# Patient Record
Sex: FEMALE | Race: WHITE | Employment: OTHER | URBAN - METROPOLITAN AREA
[De-identification: names, ages, dates, MRNs, and addresses within clinical notes are randomized per-mention and may not be internally consistent; named-entity substitution may affect disease eponyms.]

---

## 2017-02-06 ENCOUNTER — APPOINTMENT (OUTPATIENT)
Dept: CT IMAGING | Age: 82
End: 2017-02-06
Attending: FAMILY MEDICINE
Payer: MEDICARE

## 2017-02-06 ENCOUNTER — HOSPITAL ENCOUNTER (EMERGENCY)
Age: 82
Discharge: HOME | End: 2017-02-06
Attending: FAMILY MEDICINE
Payer: MEDICARE

## 2017-02-06 ENCOUNTER — APPOINTMENT (OUTPATIENT)
Dept: GENERAL RADIOLOGY | Age: 82
End: 2017-02-06
Attending: FAMILY MEDICINE
Payer: MEDICARE

## 2017-02-06 VITALS
TEMPERATURE: 97.5 F | DIASTOLIC BLOOD PRESSURE: 63 MMHG | SYSTOLIC BLOOD PRESSURE: 166 MMHG | HEART RATE: 82 BPM | OXYGEN SATURATION: 95 % | RESPIRATION RATE: 16 BRPM | WEIGHT: 126 LBS | HEIGHT: 64 IN | BODY MASS INDEX: 21.51 KG/M2

## 2017-02-06 DIAGNOSIS — N39.0 ACUTE UTI: ICD-10-CM

## 2017-02-06 DIAGNOSIS — M79.641 RIGHT HAND PAIN: Primary | ICD-10-CM
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
## 2017-02-06 LAB
ANION GAP BLD CALC-SCNC: 6 MMOL/L (ref 3–18)
APPEARANCE UR: CLEAR
BACTERIA URNS QL MICRO: ABNORMAL /HPF
BASOPHILS # BLD AUTO: 0 K/UL (ref 0–0.06)
BASOPHILS # BLD: 0 % (ref 0–2)
BILIRUB UR QL: NEGATIVE
BUN SERPL-MCNC: 35 MG/DL (ref 7–18)
BUN/CREAT SERPL: 20 (ref 12–20)
CALCIUM SERPL-MCNC: 9.1 MG/DL (ref 8.5–10.1)
CHLORIDE SERPL-SCNC: 108 MMOL/L (ref 100–108)
CK MB CFR SERPL CALC: 2 % (ref 0–4)
CK MB SERPL-MCNC: 0.9 NG/ML (ref 0.5–3.6)
CK SERPL-CCNC: 45 U/L (ref 26–192)
CO2 SERPL-SCNC: 30 MMOL/L (ref 21–32)
COLOR UR: YELLOW
CREAT SERPL-MCNC: 1.71 MG/DL (ref 0.6–1.3)
DIFFERENTIAL METHOD BLD: ABNORMAL
EOSINOPHIL # BLD: 0.2 K/UL (ref 0–0.4)
EOSINOPHIL NFR BLD: 3 % (ref 0–5)
EPITH CASTS URNS QL MICRO: ABNORMAL /LPF (ref 0–5)
ERYTHROCYTE [DISTWIDTH] IN BLOOD BY AUTOMATED COUNT: 13.6 % (ref 11.6–14.5)
GLUCOSE SERPL-MCNC: 99 MG/DL (ref 74–99)
GLUCOSE UR STRIP.AUTO-MCNC: NEGATIVE MG/DL
HCT VFR BLD AUTO: 31 % (ref 35–45)
HGB BLD-MCNC: 9.8 G/DL (ref 12–16)
HGB UR QL STRIP: NEGATIVE
KETONES UR QL STRIP.AUTO: NEGATIVE MG/DL
LEUKOCYTE ESTERASE UR QL STRIP.AUTO: ABNORMAL
LYMPHOCYTES # BLD AUTO: 22 % (ref 21–52)
LYMPHOCYTES # BLD: 1.5 K/UL (ref 0.9–3.6)
MCH RBC QN AUTO: 30.8 PG (ref 24–34)
MCHC RBC AUTO-ENTMCNC: 31.6 G/DL (ref 31–37)
MCV RBC AUTO: 97.5 FL (ref 74–97)
MONOCYTES # BLD: 0.6 K/UL (ref 0.05–1.2)
MONOCYTES NFR BLD AUTO: 8 % (ref 3–10)
NEUTS SEG # BLD: 4.5 K/UL (ref 1.8–8)
NEUTS SEG NFR BLD AUTO: 67 % (ref 40–73)
NITRITE UR QL STRIP.AUTO: NEGATIVE
PH UR STRIP: 5 [PH] (ref 5–8)
PLATELET # BLD AUTO: 235 K/UL (ref 135–420)
PMV BLD AUTO: 9.3 FL (ref 9.2–11.8)
POTASSIUM SERPL-SCNC: 4.2 MMOL/L (ref 3.5–5.5)
PROT UR STRIP-MCNC: NEGATIVE MG/DL
RBC # BLD AUTO: 3.18 M/UL (ref 4.2–5.3)
RBC #/AREA URNS HPF: 0 /HPF (ref 0–5)
SODIUM SERPL-SCNC: 144 MMOL/L (ref 136–145)
SP GR UR REFRACTOMETRY: 1.01 (ref 1–1.03)
TROPONIN I SERPL-MCNC: 0.03 NG/ML (ref 0–0.06)
UROBILINOGEN UR QL STRIP.AUTO: 0.2 EU/DL (ref 0.2–1)
WBC # BLD AUTO: 6.7 K/UL (ref 4.6–13.2)
WBC URNS QL MICRO: ABNORMAL /HPF (ref 0–5)

## 2017-02-06 PROCEDURE — 93005 ELECTROCARDIOGRAM TRACING: CPT

## 2017-02-06 PROCEDURE — 74011250636 HC RX REV CODE- 250/636: Performed by: FAMILY MEDICINE

## 2017-02-06 PROCEDURE — 70450 CT HEAD/BRAIN W/O DYE: CPT

## 2017-02-06 PROCEDURE — 80048 BASIC METABOLIC PNL TOTAL CA: CPT | Performed by: FAMILY MEDICINE

## 2017-02-06 PROCEDURE — 82550 ASSAY OF CK (CPK): CPT | Performed by: FAMILY MEDICINE

## 2017-02-06 PROCEDURE — 73130 X-RAY EXAM OF HAND: CPT

## 2017-02-06 PROCEDURE — 85025 COMPLETE CBC W/AUTO DIFF WBC: CPT | Performed by: FAMILY MEDICINE

## 2017-02-06 PROCEDURE — 81001 URINALYSIS AUTO W/SCOPE: CPT | Performed by: FAMILY MEDICINE

## 2017-02-06 PROCEDURE — 96372 THER/PROPH/DIAG INJ SC/IM: CPT

## 2017-02-06 PROCEDURE — 71010 XR CHEST PORT: CPT

## 2017-02-06 PROCEDURE — 77030011943

## 2017-02-06 PROCEDURE — 51701 INSERT BLADDER CATHETER: CPT

## 2017-02-06 PROCEDURE — 73110 X-RAY EXAM OF WRIST: CPT

## 2017-02-06 PROCEDURE — 99285 EMERGENCY DEPT VISIT HI MDM: CPT

## 2017-02-06 RX ORDER — KETOROLAC TROMETHAMINE 30 MG/ML
15 INJECTION, SOLUTION INTRAMUSCULAR; INTRAVENOUS
Status: COMPLETED | OUTPATIENT
Start: 2017-02-06 | End: 2017-02-06

## 2017-02-06 RX ORDER — CIPROFLOXACIN 500 MG/1
500 TABLET ORAL 2 TIMES DAILY
Qty: 14 TAB | Refills: 0 | Status: SHIPPED | OUTPATIENT
Start: 2017-02-06 | End: 2017-02-13

## 2017-02-06 RX ADMIN — KETOROLAC TROMETHAMINE 15 MG: 30 INJECTION, SOLUTION INTRAMUSCULAR at 14:07

## 2017-02-06 NOTE — ED TRIAGE NOTES
Per EMS, pt fell from standing height onto carpet while attempting to get up. Pt w/ hx of dementia and arrives A&O per baseline A&O x 2-3. Pt holding right forearm but states \"I hurt all over. \" No deformity or bruising noted to extremity. Sepsis Screening completed    (  )Patient meets SIRS criteria. (X)Patient does not meet SIRS criteria.       SIRS Criteria is achieved when two or more of the following are present   Temperature < 96.8°F (36°C) or > 100.9°F (38.3°C)   Heart Rate > 90 beats per minute   Respiratory Rate > 20 beats per minute   WBC count > 12,000 or <4,000 or > 10% bands

## 2017-02-06 NOTE — ED PROVIDER NOTES
[address]  EMERGENCY DEPARTMENT HISTORY AND PHYSICAL EXAM       Date: 2/6/2017   Patient Name: Alisa Manjarrez   YOB: 1924  Medical Record Number: [id number]    History of Presenting Illness     Chief Complaint   Patient presents with    Fall        History Provided By:  EMS     Additional History:   11:48 AM   Alisa Manjarrez is a 80 y.o. female with a hx of CVA and dementia who presents to the emergency department from [facility] via EMS for right forearm pain s/p fall this morning, per EMS. They report that pt fell onto carpet and they are unsure if pt hit her head. ROS limited due to condition. Primary Care Provider: Stacey Giron MD   Specialist:    Past History     Past Medical History:   Past Medical History   Diagnosis Date    Anemia NEC     Dementia     Hearing reduced     Hypertension     Shingles     Stroke [facility])         Past Surgical History:   Past Surgical History   Procedure Laterality Date    Pr abdomen surgery proc unlisted      Hx cholecystectomy      Hx gyn      Hx hysterectomy          Family History:   History reviewed. No pertinent family history. Social History:   Social History   Substance Use Topics    Smoking status: Former Smoker    Smokeless tobacco: None    Alcohol use No        Allergies: Allergies   Allergen Reactions    Penicillins Hives        Review of Systems   Review of Systems   Unable to perform ROS: Dementia       Physical Exam  Vitals:    02/06/17 1245 02/06/17 1300 02/06/17 1330 02/06/17 1400   BP: 163/81 188/82 178/57 174/68   Pulse: 78 80 80 80   Resp: 22 27 19 17   Temp:       SpO2: 97% 95% 97% 95%   Weight:       Height:           Physical Exam   Nursing note and vitals reviewed. Vital signs and nursing notes reviewed    CONSTITUTIONAL: Alert, in no apparent distress; well-developed; well-nourished. Elderly male. Demented. HEAD:  Normocephalic, atraumatic  EYES: PERRL; EOM's intact.  Chronically dry right eye, right eye redness/conjunctiva injected. ENTM: Nose: no rhinorrhea; Throat: no erythema or exudate, mucous membranes moist  Neck:  No JVD, supple without lymphadenopathy  RESP: Chest clear, equal breath sounds. CV: S1 and S2 WNL; No murmurs, gallops or rubs. GI: Normal bowel sounds, abdomen soft and non-tender. No masses or organomegaly. : No costo-vertebral angle tenderness. BACK:  Non-tender  UPPER EXT:  Tenderness right hand/right wrist; no obvious deformity. LOWER EXT: No edema, no calf tenderness. Distal pulses intact. NEURO: CN intact, reflexes 2/4 and sym, strength 5/5 and sym, sensation intact. Can answer some questions. Moves all extremities. SKIN: No rashes; Normal for age and stage.       Diagnostic Study Results     Labs -      Recent Results (from the past 12 hour(s))   URINALYSIS W/ RFLX MICROSCOPIC    Collection Time: 02/06/17 12:15 PM   Result Value Ref Range    Color YELLOW      Appearance CLEAR      Specific gravity 1.011 1.005 - 1.030      pH (UA) 5.0 5.0 - 8.0      Protein NEGATIVE  NEG mg/dL    Glucose NEGATIVE  NEG mg/dL    Ketone NEGATIVE  NEG mg/dL    Bilirubin NEGATIVE  NEG      Blood NEGATIVE  NEG      Urobilinogen 0.2 0.2 - 1.0 EU/dL    Nitrites NEGATIVE  NEG      Leukocyte Esterase SMALL (A) NEG     URINE MICROSCOPIC ONLY    Collection Time: 02/06/17 12:15 PM   Result Value Ref Range    WBC 2 to 4 0 - 5 /hpf    RBC 0 0 - 5 /hpf    Epithelial cells FEW 0 - 5 /lpf    Bacteria 3+ (A) NEG /hpf   CBC WITH AUTOMATED DIFF    Collection Time: 02/06/17 12:25 PM   Result Value Ref Range    WBC 6.7 4.6 - 13.2 K/uL    RBC 3.18 (L) 4.20 - 5.30 M/uL    HGB 9.8 (L) 12.0 - 16.0 g/dL    HCT 31.0 (L) 35.0 - 45.0 %    MCV 97.5 (H) 74.0 - 97.0 FL    MCH 30.8 24.0 - 34.0 PG    MCHC 31.6 31.0 - 37.0 g/dL    RDW 13.6 11.6 - 14.5 %    PLATELET 973 144 - 833 K/uL    MPV 9.3 9.2 - 11.8 FL    NEUTROPHILS 67 40 - 73 %    LYMPHOCYTES 22 21 - 52 %    MONOCYTES 8 3 - 10 %    EOSINOPHILS 3 0 - 5 % BASOPHILS 0 0 - 2 %    ABS. NEUTROPHILS 4.5 1.8 - 8.0 K/UL    ABS. LYMPHOCYTES 1.5 0.9 - 3.6 K/UL    ABS. MONOCYTES 0.6 0.05 - 1.2 K/UL    ABS. EOSINOPHILS 0.2 0.0 - 0.4 K/UL    ABS. BASOPHILS 0.0 0.0 - 0.06 K/UL    DF AUTOMATED     METABOLIC PANEL, BASIC    Collection Time: 02/06/17 12:25 PM   Result Value Ref Range    Sodium 144 136 - 145 mmol/L    Potassium 4.2 3.5 - 5.5 mmol/L    Chloride 108 100 - 108 mmol/L    CO2 30 21 - 32 mmol/L    Anion gap 6 3.0 - 18 mmol/L    Glucose 99 74 - 99 mg/dL    BUN 35 (H) 7.0 - 18 MG/DL    Creatinine 1.71 (H) 0.6 - 1.3 MG/DL    BUN/Creatinine ratio 20 12 - 20      GFR est AA 34 (L) >60 ml/min/1.73m2    GFR est non-AA 28 (L) >60 ml/min/1.73m2    Calcium 9.1 8.5 - 10.1 MG/DL   CARDIAC PANEL,(CK, CKMB & TROPONIN)    Collection Time: 02/06/17 12:25 PM   Result Value Ref Range    CK 45 26 - 192 U/L    CK - MB 0.9 0.5 - 3.6 ng/ml    CK-MB Index 2.0 0.0 - 4.0 %    Troponin-I, Qt. 0.03 0.00 - 0.06 NG/ML   EKG, 12 LEAD, INITIAL    Collection Time: 02/06/17  1:12 PM   Result Value Ref Range    Ventricular Rate 81 BPM    Atrial Rate 81 BPM    P-R Interval 210 ms    QRS Duration 108 ms    Q-T Interval 410 ms    QTC Calculation (Bezet) 476 ms    Calculated P Axis 69 degrees    Calculated R Axis -30 degrees    Calculated T Axis 115 degrees    Diagnosis       Sinus rhythm with 1st degree AV block  Left axis deviation  Left ventricular hypertrophy with repolarization abnormality  Abnormal ECG  When compared with ECG of 22-APR-2015 12:24,  ME interval has increased         Radiologic Studies -  XR CHEST PORT   Final Result  IMPRESSION:     No acute pulmonary process identified. As read by the radiologist.       CT HEAD WO CONT   Final Result  IMPRESSION:        1. No acute intracranial abnormality. Of note, noncontrast head CT can be normal  in the context of early acute stroke.   2. Subcortical and periventricular white matter hypoattenuation, an unchanged  and nonspecific finding likely pertaining to chronic ischemic microvascular  Change. As read by the radiologist.       XR HAND RT MIN 3 V    (Results Pending)   XR WRIST RT AP/LAT/OBL MIN 3V    (Results Pending)      2:16 PM  RADIOLOGY FINDINGS  Right hand X-ray shows no acute process, chronic changes  Pending review by Radiologist     2:16 PM  RADIOLOGY FINDINGS  Right wrist X-ray shows no acute process, chronic changes  Pending review by Radiologist       Medical Decision Making   I am the first provider for this patient. I reviewed the vital signs, available nursing notes, past medical history, past surgical history, family history and social history. Vital Signs-Reviewed the patient's vital signs. Patient Vitals for the past 12 hrs:   Temp Pulse Resp BP SpO2   02/06/17 1400 - 80 17 174/68 95 %   02/06/17 1330 - 80 19 178/57 97 %   02/06/17 1300 - 80 27 188/82 95 %   02/06/17 1245 - 78 22 163/81 97 %   02/06/17 1145 97.5 °F (36.4 °C) 76 19 148/63 100 %       Pulse Oximetry Analysis - Normal 100% on RA     Cardiac Monitor:   Rate: 77 bpm  Rhythm: Normal Sinus Rhythm     EKG interpretation: (Preliminary)  Sinus rhythm with 1st degree AV block, 81 bpm, left axis deviation, left ventricular hypertrophy with repolarization abnormality   EKG read by Sylwia Morel MD at 13:12    Old Medical Records: Nursing notes. Provider Notes:      INITIAL CLINICAL IMPRESSION and PLANS:  The patient presents with the primary complaint(s) of: fall. The presentation, to include historical aspects and clinical findings are consistent with the DX of post head injury. However, other possible DX's to consider as primary, associated with, or exacerbated by include:    1. Wrist fracture  2. Wrist sprain  3. Hand fracture  4. Hand contusion    Considering the above, my initial management plan to evaluate and therapeutic interventions include the following and as noted in the orders:    1. Labs: CBC, BMP, cardiac panel, UA  2.   Imaging: CXR, CT head, XR right hand, XR right wrist    Medications Given in the ED:  Medications   ketorolac (TORADOL) injection 15 mg (15 mg IntraMUSCular Given 2/6/17 9874)       PROGRESS NOTE:   11:48 AM  Initial assessment performed. Discharge Note:  2:39 PM   Pt has been reexamined. Patient has no new complaints, changes, or physical findings. Care plan outlined and precautions discussed. Results were reviewed with the patient. All medications were reviewed with the patient; will d/c home with Cipro. All of pt's questions and concerns were addressed. Patient was instructed and agrees to follow up with PCP, as well as to return to the ED upon further deterioration. Patient is ready to go home. Diagnosis   Clinical Impression:   1. Right hand pain    2. Fall, initial encounter    3. Acute UTI         Discussion:  Patient presented with unwitnessed fall and complains of pain all over. There is  no obvious deformities in any extremities. Patient's x-rays of right hand/ wrist and chest were negative, and her head CT was negative. She will be sent back to the nursing home she does have a UTI. She will be treated with Cipro. Follow-up Information     Follow up With Details Comments Contact Info    Chucky Munoz MD Schedule an appointment as soon as possible for a visit  [address] [id number] [phone]      [facility] EMERGENCY DEPT  As needed, If symptoms worsen [address] [facility] [address] [id number]  [phone]          Current Discharge Medication List      START taking these medications    Details   ciprofloxacin HCl (CIPRO) 500 mg tablet Take 1 Tab by mouth two (2) times a day for 7 days. Qty: 14 Tab, Refills: 0         CONTINUE these medications which have NOT CHANGED    Details   cephALEXin (KEFLEX) 250 mg capsule Take 1 Cap by mouth two (2) times a day.  Indications: BACTERIAL URINARY TRACT INFECTION  Qty: 7 Cap, Refills: 0      acetaminophen (TYLENOL) 325 mg tablet Take 325 mg by mouth every four (4) hours as needed for Pain. loperamide (IMODIUM) 2 mg capsule Take 2 mg by mouth. ondansetron hcl (ZOFRAN, AS HYDROCHLORIDE,) 8 mg tablet Take 8 mg by mouth every eight (8) hours as needed for Nausea. polyethylene glycol (MIRALAX) 17 gram packet Take 17 g by mouth daily. carboxymethylcellulose sodium (REFRESH LIQUIGEL) 1 % dlgl Apply  to eye.      vitamin c-vitamin e (CRANBERRY CONCENTRATE) cap Take 500 mg by mouth. oxybutynin (DITROPAN) 5 mg tablet Take 5 mg by mouth three (3) times daily. cholecalciferol, vitamin D3, (VITAMIN D3) 2,000 unit tab Take  by mouth.      magnesium oxide (MAG-OX) 400 mg tablet Take 400 mg by mouth daily. furosemide (LASIX) 20 mg tablet Take 20 mg by mouth daily. spironolactone (ALDACTONE) 25 mg tablet Take 25 mg by mouth daily. omeprazole (PRILOSEC) 20 mg capsule Take 20 mg by mouth daily. aspirin 81 mg chewable tablet Take 81 mg by mouth daily. CYANOCOBALAMIN/FA/PYRIDOXINE (FOLTX PO) Take  by mouth daily. levothyroxine (SYNTHROID) 100 mcg tablet Take 100 mcg by mouth Daily (before breakfast). amLODIPine (NORVASC) 5 mg tablet Take 5 mg by mouth daily. Indications: HYPERTENSION      fludrocortisone (FLORINEF) 0.1 mg tablet Take  by mouth every other day. LORazepam (ATIVAN) 1 mg tablet Take 1 mg by mouth nightly as needed. ferrous sulfate 325 mg (65 mg iron) tablet Take  by mouth Daily (before breakfast). _______________________________   Attestations: This note is prepared by Nery Navarro, acting as scribe for Thomas Fry MD on 02/06/2017 at 11:48 AM    Thomas Fry MD: The scribe's documentation has been prepared under my direction and personally reviewed by me and its entirety.      _______________________________

## 2017-02-06 NOTE — DISCHARGE INSTRUCTIONS
Hand Pain: Care Instructions  Your Care Instructions  Common causes of hand pain are overuse and injuries, such as might happen during sports or home repair projects. Everyday wear and tear, especially as you get older, also can cause hand pain. Most minor hand injuries will heal on their own, and home treatment is usually all you need to do. If you have sudden and severe pain, you may need tests and treatment. Follow-up care is a key part of your treatment and safety. Be sure to make and go to all appointments, and call your doctor if you are having problems. Its also a good idea to know your test results and keep a list of the medicines you take. How can you care for yourself at home? · Take pain medicines exactly as directed. ¨ If the doctor gave you a prescription medicine for pain, take it as prescribed. ¨ If you are not taking a prescription pain medicine, ask your doctor if you can take an over-the-counter medicine. · Rest and protect your hand. Take a break from any activity that may cause pain. · Put ice or a cold pack on your hand for 10 to 20 minutes at a time. Put a thin cloth between the ice and your skin. · Prop up the sore hand on a pillow when you ice it or anytime you sit or lie down during the next 3 days. Try to keep it above the level of your heart. This will help reduce swelling. · If your doctor recommends a sling, splint, or elastic bandage to support your hand, wear it as directed. When should you call for help? Call 911 anytime you think you may need emergency care. For example, call if:  · Your hand turns cool or pale or changes color. Call your doctor now or seek immediate medical care if:  · You cannot move your hand. · Your hand pops, moves out of its normal position, and then returns to its normal position. · You have signs of infection, such as:  ¨ Increased pain, swelling, warmth, or redness. ¨ Red streaks leading from the sore area.   ¨ Pus draining from a place on your hand. ¨ A fever. · Your hand feels numb or tingly. Watch closely for changes in your health, and be sure to contact your doctor if:  · Your hand feels unstable when you try to use it. · You do not get better as expected. · You have any new symptoms, such as swelling. · Bruises from an injury to your hand last longer than 2 weeks. Where can you learn more? Go to http://velma-donato.info/. Enter R273 in the search box to learn more about \"Hand Pain: Care Instructions. \"  Current as of: May 27, 2016  Content Version: 11.1  © 7703-0917 [employer]. Care instructions adapted under license by [email or web address] (which disclaims liability or warranty for this information). If you have questions about a medical condition or this instruction, always ask your healthcare professional. [place] [address] any warranty or liability for your use of this information. Urinary Tract Infection in Women: Care Instructions  Your Care Instructions    A urinary tract infection, or UTI, is a general term for an infection anywhere between the kidneys and the urethra (where urine comes out). Most UTIs are bladder infections. They often cause pain or burning when you urinate. UTIs are caused by bacteria and can be cured with antibiotics. Be sure to complete your treatment so that the infection goes away. Follow-up care is a key part of your treatment and safety. Be sure to make and go to all appointments, and call your doctor if you are having problems. It's also a good idea to know your test results and keep a list of the medicines you take. How can you care for yourself at home? · Take your antibiotics as directed. Do not stop taking them just because you feel better. You need to take the full course of antibiotics. · Drink extra water and other fluids for the next day or two. This may help wash out the bacteria that are causing the infection.  (If you have kidney, heart, or liver disease and have to limit fluids, talk with your doctor before you increase your fluid intake.)  · Avoid drinks that are carbonated or have caffeine. They can irritate the bladder. · Urinate often. Try to empty your bladder each time. · To relieve pain, take a hot bath or lay a heating pad set on low over your lower belly or genital area. Never go to sleep with a heating pad in place. To prevent UTIs  · Drink plenty of water each day. This helps you urinate often, which clears bacteria from your system. (If you have kidney, heart, or liver disease and have to limit fluids, talk with your doctor before you increase your fluid intake.)  · Consider adding cranberry juice to your diet. · Urinate when you need to. · Urinate right after you have sex. · Change sanitary pads often. · Avoid douches, bubble baths, feminine hygiene sprays, and other feminine hygiene products that have deodorants. · After going to the bathroom, wipe from front to back. When should you call for help? Call your doctor now or seek immediate medical care if:  · Symptoms such as fever, chills, nausea, or vomiting get worse or appear for the first time. · You have new pain in your back just below your rib cage. This is called flank pain. · There is new blood or pus in your urine. · You have any problems with your antibiotic medicine. Watch closely for changes in your health, and be sure to contact your doctor if:  · You are not getting better after taking an antibiotic for 2 days. · Your symptoms go away but then come back. Where can you learn more? Go to http://velma-donato.info/. Enter M821 in the search box to learn more about \"Urinary Tract Infection in Women: Care Instructions. \"  Current as of: August 12, 2016  Content Version: 11.1  © 3422-4413 [employer].  Care instructions adapted under license by [employer] (which disclaims liability or warranty for this information). If you have questions about a medical condition or this instruction, always ask your healthcare professional. [place] [address] any warranty or liability for your use of this information.

## 2017-02-06 NOTE — ED NOTES
Verbal report given over phone to Maisha Cruz RN from [facility], ED summary, MAR, recent results reviewed along w/ discharge information. Receiving RN aware that pt will be sent back via ambulance and that family has been updated over the phone. Receiving RN verbalizes understanding.

## 2017-02-06 NOTE — ED NOTES
Pt bedside report given to [facility]. SBAR, ED summary, recent results, and MAR. Discharge instructions also reviewed with [facility] Bimal [place]) and receiving RN at [facility] with opportunity for questions given. Discharge papers and prescriptions sent w/ [facility]. Pt unable to sign discharge paperwork due to hx of dementia. Patient in stable condition at time of discharge. Patient armband removed and shredded. Pt valubles w/ pt.

## 2017-02-06 NOTE — ED NOTES
Pt incontinent of urine. Pt cleaned and placed in clean brief w/ clean green chux placed underneath pt. Pt repositioned for comfort. Pt resting in stretcher with side rails raised and call light within reach.

## 2017-02-06 NOTE — ED NOTES
Attempted to straight cath pt for urine. Unable to obtain specimen. Pt positioned for comfort in stretcher with clean brief and green chux. Some urine noted to brief when pt changed.

## 2017-02-12 LAB
ATRIAL RATE: 81 BPM
CALCULATED P AXIS, ECG09: 69 DEGREES
CALCULATED R AXIS, ECG10: -30 DEGREES
CALCULATED T AXIS, ECG11: 115 DEGREES
DIAGNOSIS, 93000: NORMAL
P-R INTERVAL, ECG05: 210 MS
Q-T INTERVAL, ECG07: 410 MS
QRS DURATION, ECG06: 108 MS
QTC CALCULATION (BEZET), ECG08: 476 MS
VENTRICULAR RATE, ECG03: 81 BPM

## 2017-07-03 ENCOUNTER — HOSPITAL ENCOUNTER (INPATIENT)
Age: 82
LOS: 5 days | Discharge: NURSING, REHAB OR LONG-TERM CARE | DRG: 178 | End: 2017-07-08
Attending: FAMILY MEDICINE | Admitting: HOSPITALIST
Payer: MEDICARE

## 2017-07-03 ENCOUNTER — APPOINTMENT (OUTPATIENT)
Dept: GENERAL RADIOLOGY | Age: 82
DRG: 178 | End: 2017-07-03
Attending: PHYSICIAN ASSISTANT
Payer: MEDICARE

## 2017-07-03 DIAGNOSIS — J18.9 PNEUMONIA OF RIGHT LOWER LOBE DUE TO INFECTIOUS ORGANISM: ICD-10-CM

## 2017-07-03 DIAGNOSIS — R53.81 DEBILITY, UNSPECIFIED: ICD-10-CM

## 2017-07-03 DIAGNOSIS — J69.0 ASPIRATION PNEUMONIA OF RIGHT UPPER LOBE DUE TO REGURGITATED FOOD (HCC): ICD-10-CM

## 2017-07-03 DIAGNOSIS — R09.02 HYPOXIA: Primary | ICD-10-CM

## 2017-07-03 DIAGNOSIS — F03.90 DEMENTIA WITHOUT BEHAVIORAL DISTURBANCE, UNSPECIFIED DEMENTIA TYPE: ICD-10-CM

## 2017-07-03 LAB
ALBUMIN SERPL BCP-MCNC: 2.4 G/DL (ref 3.4–5)
ALBUMIN/GLOB SERPL: 0.6 {RATIO} (ref 0.8–1.7)
ALP SERPL-CCNC: 205 U/L (ref 45–117)
ALT SERPL-CCNC: 20 U/L (ref 13–56)
ANION GAP BLD CALC-SCNC: 5 MMOL/L (ref 3–18)
AST SERPL W P-5'-P-CCNC: 29 U/L (ref 15–37)
ATRIAL RATE: 220 BPM
BASOPHILS # BLD AUTO: 0 K/UL (ref 0–0.06)
BASOPHILS # BLD: 0 % (ref 0–2)
BILIRUB SERPL-MCNC: 0.5 MG/DL (ref 0.2–1)
BNP SERPL-MCNC: 5626 PG/ML (ref 0–1800)
BUN SERPL-MCNC: 37 MG/DL (ref 7–18)
BUN/CREAT SERPL: 23 (ref 12–20)
CALCIUM SERPL-MCNC: 8 MG/DL (ref 8.5–10.1)
CALCULATED R AXIS, ECG10: -29 DEGREES
CALCULATED T AXIS, ECG11: 104 DEGREES
CHLORIDE SERPL-SCNC: 106 MMOL/L (ref 100–108)
CK MB CFR SERPL CALC: 4.6 % (ref 0–4)
CK MB SERPL-MCNC: 2.4 NG/ML (ref 5–25)
CK SERPL-CCNC: 52 U/L (ref 26–192)
CO2 SERPL-SCNC: 33 MMOL/L (ref 21–32)
CREAT SERPL-MCNC: 1.59 MG/DL (ref 0.6–1.3)
DIAGNOSIS, 93000: NORMAL
DIFFERENTIAL METHOD BLD: ABNORMAL
EOSINOPHIL # BLD: 0.4 K/UL (ref 0–0.4)
EOSINOPHIL NFR BLD: 6 % (ref 0–5)
ERYTHROCYTE [DISTWIDTH] IN BLOOD BY AUTOMATED COUNT: 14.5 % (ref 11.6–14.5)
GLOBULIN SER CALC-MCNC: 4.2 G/DL (ref 2–4)
GLUCOSE SERPL-MCNC: 95 MG/DL (ref 74–99)
HCT VFR BLD AUTO: 31 % (ref 35–45)
HGB BLD-MCNC: 9.9 G/DL (ref 12–16)
LYMPHOCYTES # BLD AUTO: 22 % (ref 21–52)
LYMPHOCYTES # BLD: 1.6 K/UL (ref 0.9–3.6)
MCH RBC QN AUTO: 30.7 PG (ref 24–34)
MCHC RBC AUTO-ENTMCNC: 31.9 G/DL (ref 31–37)
MCV RBC AUTO: 96.3 FL (ref 74–97)
MONOCYTES # BLD: 0.5 K/UL (ref 0.05–1.2)
MONOCYTES NFR BLD AUTO: 7 % (ref 3–10)
NEUTS SEG # BLD: 4.6 K/UL (ref 1.8–8)
NEUTS SEG NFR BLD AUTO: 65 % (ref 40–73)
PLATELET # BLD AUTO: 300 K/UL (ref 135–420)
PMV BLD AUTO: 8.7 FL (ref 9.2–11.8)
POTASSIUM SERPL-SCNC: 3.1 MMOL/L (ref 3.5–5.5)
PROT SERPL-MCNC: 6.6 G/DL (ref 6.4–8.2)
Q-T INTERVAL, ECG07: 412 MS
QRS DURATION, ECG06: 124 MS
QTC CALCULATION (BEZET), ECG08: 453 MS
RBC # BLD AUTO: 3.22 M/UL (ref 4.2–5.3)
SODIUM SERPL-SCNC: 144 MMOL/L (ref 136–145)
TROPONIN I SERPL-MCNC: 0.05 NG/ML (ref 0–0.06)
VENTRICULAR RATE, ECG03: 73 BPM
WBC # BLD AUTO: 7.2 K/UL (ref 4.6–13.2)

## 2017-07-03 PROCEDURE — 83880 ASSAY OF NATRIURETIC PEPTIDE: CPT | Performed by: PHYSICIAN ASSISTANT

## 2017-07-03 PROCEDURE — 94762 N-INVAS EAR/PLS OXIMTRY CONT: CPT

## 2017-07-03 PROCEDURE — 99285 EMERGENCY DEPT VISIT HI MDM: CPT

## 2017-07-03 PROCEDURE — 85025 COMPLETE CBC W/AUTO DIFF WBC: CPT | Performed by: PHYSICIAN ASSISTANT

## 2017-07-03 PROCEDURE — 80053 COMPREHEN METABOLIC PANEL: CPT | Performed by: PHYSICIAN ASSISTANT

## 2017-07-03 PROCEDURE — 82550 ASSAY OF CK (CPK): CPT | Performed by: PHYSICIAN ASSISTANT

## 2017-07-03 PROCEDURE — 74011250636 HC RX REV CODE- 250/636: Performed by: PHYSICIAN ASSISTANT

## 2017-07-03 PROCEDURE — 71010 XR CHEST PORT: CPT

## 2017-07-03 PROCEDURE — 74011250637 HC RX REV CODE- 250/637: Performed by: HOSPITALIST

## 2017-07-03 PROCEDURE — 93005 ELECTROCARDIOGRAM TRACING: CPT

## 2017-07-03 PROCEDURE — 74011000250 HC RX REV CODE- 250: Performed by: HOSPITALIST

## 2017-07-03 PROCEDURE — 74011250636 HC RX REV CODE- 250/636: Performed by: HOSPITALIST

## 2017-07-03 PROCEDURE — 65270000029 HC RM PRIVATE

## 2017-07-03 PROCEDURE — 74011000258 HC RX REV CODE- 258: Performed by: HOSPITALIST

## 2017-07-03 PROCEDURE — 87040 BLOOD CULTURE FOR BACTERIA: CPT | Performed by: PHYSICIAN ASSISTANT

## 2017-07-03 RX ORDER — SODIUM CHLORIDE 0.9 % (FLUSH) 0.9 %
5-10 SYRINGE (ML) INJECTION AS NEEDED
Status: DISCONTINUED | OUTPATIENT
Start: 2017-07-03 | End: 2017-07-07

## 2017-07-03 RX ORDER — OMEPRAZOLE 20 MG/1
20 CAPSULE, DELAYED RELEASE ORAL DAILY
Status: DISCONTINUED | OUTPATIENT
Start: 2017-07-04 | End: 2017-07-08 | Stop reason: HOSPADM

## 2017-07-03 RX ORDER — LEVOTHYROXINE SODIUM 100 UG/1
100 TABLET ORAL
Status: DISCONTINUED | OUTPATIENT
Start: 2017-07-04 | End: 2017-07-08 | Stop reason: HOSPADM

## 2017-07-03 RX ORDER — FUROSEMIDE 20 MG/1
20 TABLET ORAL DAILY
Status: DISCONTINUED | OUTPATIENT
Start: 2017-07-04 | End: 2017-07-05

## 2017-07-03 RX ORDER — SPIRONOLACTONE 25 MG/1
25 TABLET ORAL DAILY
Status: DISCONTINUED | OUTPATIENT
Start: 2017-07-04 | End: 2017-07-08 | Stop reason: HOSPADM

## 2017-07-03 RX ORDER — OXYBUTYNIN CHLORIDE 5 MG/1
5 TABLET ORAL 3 TIMES DAILY
Status: DISCONTINUED | OUTPATIENT
Start: 2017-07-03 | End: 2017-07-06

## 2017-07-03 RX ORDER — SAME BUTANEDISULFONATE/BETAINE 400-600 MG
250 POWDER IN PACKET (EA) ORAL 2 TIMES DAILY
Status: DISCONTINUED | OUTPATIENT
Start: 2017-07-03 | End: 2017-07-08 | Stop reason: HOSPADM

## 2017-07-03 RX ORDER — LANOLIN ALCOHOL/MO/W.PET/CERES
400 CREAM (GRAM) TOPICAL DAILY
Status: DISCONTINUED | OUTPATIENT
Start: 2017-07-04 | End: 2017-07-08 | Stop reason: HOSPADM

## 2017-07-03 RX ORDER — AMLODIPINE BESYLATE 5 MG/1
5 TABLET ORAL DAILY
Status: DISCONTINUED | OUTPATIENT
Start: 2017-07-04 | End: 2017-07-05

## 2017-07-03 RX ORDER — SODIUM CHLORIDE 0.9 % (FLUSH) 0.9 %
5-10 SYRINGE (ML) INJECTION EVERY 8 HOURS
Status: DISCONTINUED | OUTPATIENT
Start: 2017-07-03 | End: 2017-07-07

## 2017-07-03 RX ORDER — LEVOFLOXACIN 5 MG/ML
750 INJECTION, SOLUTION INTRAVENOUS
Status: DISCONTINUED | OUTPATIENT
Start: 2017-07-05 | End: 2017-07-07

## 2017-07-03 RX ORDER — GUAIFENESIN 100 MG/5ML
81 LIQUID (ML) ORAL DAILY
Status: DISCONTINUED | OUTPATIENT
Start: 2017-07-04 | End: 2017-07-08 | Stop reason: HOSPADM

## 2017-07-03 RX ORDER — LEVOFLOXACIN 5 MG/ML
500 INJECTION, SOLUTION INTRAVENOUS
Status: COMPLETED | OUTPATIENT
Start: 2017-07-03 | End: 2017-07-03

## 2017-07-03 RX ORDER — HEPARIN SODIUM 5000 [USP'U]/ML
5000 INJECTION, SOLUTION INTRAVENOUS; SUBCUTANEOUS EVERY 8 HOURS
Status: DISCONTINUED | OUTPATIENT
Start: 2017-07-03 | End: 2017-07-08 | Stop reason: HOSPADM

## 2017-07-03 RX ADMIN — Medication 10 ML: at 23:21

## 2017-07-03 RX ADMIN — CLINDAMYCIN PHOSPHATE 300 MG: 150 INJECTION, SOLUTION, CONCENTRATE INTRAVENOUS at 20:36

## 2017-07-03 RX ADMIN — Medication 250 MG: at 23:21

## 2017-07-03 RX ADMIN — OXYBUTYNIN CHLORIDE 5 MG: 5 TABLET ORAL at 23:21

## 2017-07-03 RX ADMIN — HEPARIN SODIUM 5000 UNITS: 5000 INJECTION, SOLUTION INTRAVENOUS; SUBCUTANEOUS at 23:21

## 2017-07-03 RX ADMIN — LEVOFLOXACIN 500 MG: 5 INJECTION, SOLUTION INTRAVENOUS at 16:42

## 2017-07-03 NOTE — ROUTINE PROCESS
TRANSFER - IN REPORT:    Verbal report received from DUNIA Li RN(name) on Cynthia Campos  being received from ED(unit) for change in patient condition(pneumona)      Report consisted of patients Situation, Background, Assessment and   Recommendations(SBAR). Information from the following report(s) SBAR, Kardex, ED Summary and MAR was reviewed with the receiving nurse. Opportunity for questions and clarification was provided. Assessment completed upon patients arrival to unit and care assumed.

## 2017-07-03 NOTE — ED TRIAGE NOTES
Patient's home health nurse came and states that her oxygen level is low and she had crackles in her bases. Sepsis Screening completed    (  )Patient meets SIRS criteria. (x  )Patient does not meet SIRS criteria.       SIRS Criteria is achieved when two or more of the following are present   Temperature < 96.8°F (36°C) or > 100.9°F (38.3°C)   Heart Rate > 90 beats per minute   Respiratory Rate > 20 breaths per minute   WBC count > 12,000 or <4,000 or > 10% bands

## 2017-07-03 NOTE — ED PROVIDER NOTES
[place] [address]  EMERGENCY DEPARTMENT HISTORY AND PHYSICAL EXAM       Date: 7/3/2017   Patient Name: Cynthia Campos   YOB: 1924  Medical Record Number: [id number]    History of Presenting Illness     Chief Complaint   Patient presents with    Shortness of Breath        History Provided By:  Patient    Additional History: 2:04 PM  Cynthia Campos is a 80 y.o. female with PMHx of dementia, HTN, and a Stroke who presents to the emergency department via EMS from [facility] for a low oxygen saturation evaluation. Pt's home health nurse came today and suggested she come to the ED for an oxygen saturation level of 82% on room air and crackles in her bases. Pt has allergy to Penicillins. Pt denies any other Sx or complaints at this time. Hx limited due to patient's condition. Primary Care Provider: Prtii Bergeron MD   Specialist:    Past History     Past Medical History:   Past Medical History:   Diagnosis Date    Anemia NEC     Dementia     Hearing reduced     Hypertension     Shingles     Stroke [facility])         Past Surgical History:   Past Surgical History:   Procedure Laterality Date    ABDOMEN SURGERY PROC UNLISTED      HX CHOLECYSTECTOMY      HX GYN      HX HYSTERECTOMY          Family History:   History reviewed. No pertinent family history. Social History:   Social History   Substance Use Topics    Smoking status: Former Smoker    Smokeless tobacco: Never Used    Alcohol use No        Allergies: Allergies   Allergen Reactions    Penicillins Hives        Review of Systems   Review of Systems   Respiratory: Positive for shortness of breath (82% room air). All other systems reviewed and are negative. Physical Exam  Vitals:    07/03/17 1352   BP: 166/60   Pulse: 76   Resp: 20   Temp: 98 °F (36.7 °C)   SpO2: 100%   Weight: 60.1 kg (132 lb 9.6 oz)       Physical Exam   Constitutional: Vital signs are normal.  Non-toxic appearance. She appears ill.  No distress. Nasal cannula in place. Elderly, chronically ill appearing, in NAD   HENT:   Head: Normocephalic and atraumatic. Eyes:   Right eye chronically red & dry   Neck: Normal range of motion. Neck supple. Cardiovascular: Normal rate and regular rhythm. Pulmonary/Chest: Effort normal and breath sounds normal. She has no wheezes. Abdominal: Soft. Bowel sounds are normal. There is no tenderness. Musculoskeletal: She exhibits no edema or tenderness. Neurological: She is alert.   +chronic left sided facial droop   Nursing note and vitals reviewed. Diagnostic Study Results     Labs -      Recent Results (from the past 12 hour(s))   CBC WITH AUTOMATED DIFF    Collection Time: 07/03/17  2:30 PM   Result Value Ref Range    WBC 7.2 4.6 - 13.2 K/uL    RBC 3.22 (L) 4.20 - 5.30 M/uL    HGB 9.9 (L) 12.0 - 16.0 g/dL    HCT 31.0 (L) 35.0 - 45.0 %    MCV 96.3 74.0 - 97.0 FL    MCH 30.7 24.0 - 34.0 PG    MCHC 31.9 31.0 - 37.0 g/dL    RDW 14.5 11.6 - 14.5 %    PLATELET 230 441 - 768 K/uL    MPV 8.7 (L) 9.2 - 11.8 FL    NEUTROPHILS 65 40 - 73 %    LYMPHOCYTES 22 21 - 52 %    MONOCYTES 7 3 - 10 %    EOSINOPHILS 6 (H) 0 - 5 %    BASOPHILS 0 0 - 2 %    ABS. NEUTROPHILS 4.6 1.8 - 8.0 K/UL    ABS. LYMPHOCYTES 1.6 0.9 - 3.6 K/UL    ABS. MONOCYTES 0.5 0.05 - 1.2 K/UL    ABS. EOSINOPHILS 0.4 0.0 - 0.4 K/UL    ABS.  BASOPHILS 0.0 0.0 - 0.06 K/UL    DF AUTOMATED     METABOLIC PANEL, COMPREHENSIVE    Collection Time: 07/03/17  2:30 PM   Result Value Ref Range    Sodium 144 136 - 145 mmol/L    Potassium 3.1 (L) 3.5 - 5.5 mmol/L    Chloride 106 100 - 108 mmol/L    CO2 33 (H) 21 - 32 mmol/L    Anion gap 5 3.0 - 18 mmol/L    Glucose 95 74 - 99 mg/dL    BUN 37 (H) 7.0 - 18 MG/DL    Creatinine 1.59 (H) 0.6 - 1.3 MG/DL    BUN/Creatinine ratio 23 (H) 12 - 20      GFR est AA 37 (L) >60 ml/min/1.73m2    GFR est non-AA 30 (L) >60 ml/min/1.73m2    Calcium 8.0 (L) 8.5 - 10.1 MG/DL    Bilirubin, total 0.5 0.2 - 1.0 MG/DL    ALT (SGPT) 20 13 - 56 U/L    AST (SGOT) 29 15 - 37 U/L    Alk. phosphatase 205 (H) 45 - 117 U/L    Protein, total 6.6 6.4 - 8.2 g/dL    Albumin 2.4 (L) 3.4 - 5.0 g/dL    Globulin 4.2 (H) 2.0 - 4.0 g/dL    A-G Ratio 0.6 (L) 0.8 - 1.7     PRO-BNP    Collection Time: 07/03/17  2:30 PM   Result Value Ref Range    NT pro-BNP 5626 (H) 0 - 1800 PG/ML   CARDIAC PANEL,(CK, CKMB & TROPONIN)    Collection Time: 07/03/17  2:30 PM   Result Value Ref Range    CK 52 26 - 192 U/L    CK - MB 2.4 <3.6 ng/ml    CK-MB Index 4.6 (H) 0.0 - 4.0 %    Troponin-I, Qt. 0.05 0.00 - 0.06 NG/ML   EKG, 12 LEAD, INITIAL    Collection Time: 07/03/17  2:37 PM   Result Value Ref Range    Ventricular Rate 73 BPM    Atrial Rate 220 BPM    QRS Duration 124 ms    Q-T Interval 412 ms    QTC Calculation (Bezet) 453 ms    Calculated R Axis -29 degrees    Calculated T Axis 104 degrees    Diagnosis       Wide QRS rhythm  Left ventricular hypertrophy with QRS widening and repolarization abnormality  Abnormal ECG  When compared with ECG of 06-FEB-2017 13:12,  Wide QRS rhythm has replaced Sinus rhythm         Radiologic Studies -  The following have been ordered and reviewed:  XR CHEST PORT   Final Result:  1. Cardiomegaly. Right mid to lower lung confluent opacity, representing  Pneumonia/atelectasis. As read by the radiologist.          Medical Decision Making   I am the first provider for this patient. I reviewed the vital signs, available nursing notes, past medical history, past surgical history, family history and social history. Vital Signs-Reviewed the patient's vital signs. Patient Vitals for the past 12 hrs:   Temp Pulse Resp BP SpO2   07/03/17 1352 98 °F (36.7 °C) 76 20 166/60 100 %       Pulse Oximetry Analysis - Normal 100% on nasal cannula     Cardiac Monitor:   Rate: 71 bpm  Rhythm: SV rhythm     EKG interpretation: (Preliminary)  2:37 PM   Wide QRS rhythm. Rate 73 bpm. Left ventricular hypertrophy with QRS widening and repolarization abnormality. Abnormal ECG. EKG read by Leland Gregg PA-C     Procedures:   Procedures    ED Course:  2:04 PM  Initial assessment performed. The patients presenting problems have been discussed, and they are in agreement with the care plan formulated and outlined with them. I have encouraged them to ask questions as they arise throughout their visit. CONSULT NOTE:   3:00 PM  Leland Gregg PA-C spoke with Risa Goodson MD   Specialty: ED Attending  Discussed pt's hx, disposition, and available diagnostic and imaging results. Reviewed care plans. Consulting physician agrees with plans as outlined. Reviewed CXR and labs. Suggests admission and agrees with Levaquin IV. Written by Alexandra Higgins ED Scribe, as dictated by Leland Gregg PA-C.    CONSULT NOTE:   3:35 PM  Leland Gregg PA-C spoke with Magdalena Epps MD   Specialty: Hospitalist   Discussed pt's hx, disposition, and available diagnostic and imaging results over the telephone. Reviewed care plans. Consulting physician agrees with plans as outlined. He agrees to admit the patient to medicine. Written by Alexandra Higgins, COSME Scribe, as dictated by Leland Gregg PA-C.     3:39 PM  Patient is being admitted to the hospital by Magdalena Epps MD. The results of their tests and reasons for their admission have been discussed with them and/or available family. They convey agreement and understanding for the need to be admitted and for their admission diagnosis. CONDITIONS ON ADMISSION:  Pneumonia is present at the time of admission. MRSA is not present at the time of admission. Wound infection is not present at the time of admission. Pressure Ulcer is not present at the time of admission. Medications Given in the ED:  Medications   levoFLOXacin (LEVAQUIN) 500 mg in D5W IVPB (not administered)       Diagnosis   Clinical Impression:   1. Hypoxia    2.  Pneumonia of right lower lobe due to infectious organism         _______________________________ Attestations: This note is prepared by Pb Irwin, acting as a Scribe for Aman Peña PA-C on 1:53 PM on 7/3/2017. mAan Peña PA-C: The scribe's documentation has been prepared under my direction and personally reviewed by me in its entirety.   _______________________________

## 2017-07-03 NOTE — ROUTINE PROCESS
TRANSFER - OUT REPORT:    Verbal report given to Malgorzata Painter  being transferred to [facility] for routine progression of care       Report consisted of patients Situation, Background, Assessment and   Recommendations(SBAR). Information from the following report(s) SBAR, ED Summary and MAR was reviewed with the receiving nurse. Lines:   Peripheral IV 07/03/17 Left Forearm (Active)   Site Assessment Clean, dry, & intact 7/3/2017  2:37 PM   Phlebitis Assessment 0 7/3/2017  2:37 PM   Infiltration Assessment 0 7/3/2017  2:37 PM   Dressing Status Clean, dry, & intact 7/3/2017  2:37 PM   Dressing Type Transparent 7/3/2017  2:37 PM        Opportunity for questions and clarification was provided.       Patient transported with:   [employer]

## 2017-07-03 NOTE — H&P
History & Physical    Patient: Hao Lacy MRN: [id number]  CSN: [id number]    YOB: 1924  Age: 80 y.o. Sex: female      DOA: 7/3/2017  Primary Care Provider:  Aaliyah Dunbar MD      Assessment/Plan     Patient Active Problem List   Diagnosis Code    Failure to thrive in adult R62.7    Hx pulmonary embolism Z80.36    HTN (hypertension) I10    Weakness R53.1    Unspecified constipation K59.00    Hypoxia R09.02    Pneumonia J18.9    Dementia F03.90       Admit to medical floor    Hypoxia - secondary to pneumonia, oxygen support. Wean off oxygen. Pneumonia - likely aspiration given history of CVA. Start on levaquin and clindamycin as she is allergic to pcn. SLP eval   Will keep NPO except for meds. Diet after SLP eval.    HTN - continue home medications. She likely has CKD, follow renal function. Dementia    Palliative care    PT/OT    DVT prophylaxis    Need to address code status. CC: hypoxia       HPI:     Hao Lacy is a 80 y.o. female who has past history of CVA with right sided deficits, dementia, HTN is brought to ER by [facility]. Patient has dementia and not able to provide reliable history, no family at bedside. Per reports patient was noticed by her home health nurse to be hypoxic, she also had crackles in her lung bases. EMS was called and patient brought to ER. In ER CXR showed Right mid to lower lung confluent opacity, representing pneumonia/atelectasis. She was given levaquin and admission requested for further management. Past Medical History:   Diagnosis Date    Anemia NEC     Dementia     Hearing reduced     Hypertension     Shingles     Stroke [facility])        Past Surgical History:   Procedure Laterality Date    ABDOMEN SURGERY PROC UNLISTED      HX CHOLECYSTECTOMY      HX GYN      HX HYSTERECTOMY         History reviewed. No pertinent family history.     Social History     Social History    Marital status:      Spouse name: N/A    Number of children: N/A    Years of education: N/A     Social History Main Topics    Smoking status: Former Smoker    Smokeless tobacco: Never Used    Alcohol use No    Drug use: No    Sexual activity: No     Other Topics Concern    None     Social History Narrative       Prior to Admission medications    Medication Sig Start Date End Date Taking? Authorizing Provider   cephALEXin (KEFLEX) 250 mg capsule Take 1 Cap by mouth two (2) times a day. Indications: BACTERIAL URINARY TRACT INFECTION 8/12/16   Cris Sethi PA-C   acetaminophen (TYLENOL) 325 mg tablet Take 325 mg by mouth every four (4) hours as needed for Pain. Obdulia Albert MD   loperamide (IMODIUM) 2 mg capsule Take 2 mg by mouth. Obdulia Albert MD   ondansetron hcl (ZOFRAN, AS HYDROCHLORIDE,) 8 mg tablet Take 8 mg by mouth every eight (8) hours as needed for Nausea. Obdulia Albert MD   polyethylene glycol (MIRALAX) 17 gram packet Take 17 g by mouth daily. Obdulia Albert MD   carboxymethylcellulose sodium (REFRESH LIQUIGEL) 1 % dlgl Apply  to eye. Obdulia Albert MD   vitamin c-vitamin e (CRANBERRY CONCENTRATE) cap Take 500 mg by mouth. Obdulia Albert MD   oxybutynin (DITROPAN) 5 mg tablet Take 5 mg by mouth three (3) times daily. Obdulia Albert MD   cholecalciferol, vitamin D3, (VITAMIN D3) 2,000 unit tab Take  by mouth. Obdulia Albert MD   magnesium oxide (MAG-OX) 400 mg tablet Take 400 mg by mouth daily. Obdulia Albert MD   furosemide (LASIX) 20 mg tablet Take 20 mg by mouth daily. Obdulia Albert MD   spironolactone (ALDACTONE) 25 mg tablet Take 25 mg by mouth daily. Obdulia Albert MD   omeprazole (PRILOSEC) 20 mg capsule Take 20 mg by mouth daily. Obdulia Albert MD   aspirin 81 mg chewable tablet Take 81 mg by mouth daily. Obdulia Albert MD   CYANOCOBALAMIN/FA/PYRIDOXINE (FOLTX PO) Take  by mouth daily. Obdulia Albert MD   levothyroxine (SYNTHROID) 100 mcg tablet Take 100 mcg by mouth Daily (before breakfast).       Obdulia Albert MD   amLODIPine (NORVASC) 5 mg tablet Take 5 mg by mouth daily. Indications: HYPERTENSION    Obdulia Albert MD   fludrocortisone (FLORINEF) 0.1 mg tablet Take  by mouth every other day. Obdulia Albert MD   LORazepam (ATIVAN) 1 mg tablet Take 1 mg by mouth nightly as needed. Obdulia Albert MD   ferrous sulfate 325 mg (65 mg iron) tablet Take  by mouth Daily (before breakfast). Obdulia Albert MD       Allergies   Allergen Reactions    Penicillins Hives       Review of Systems  Unable to obtain reliable ROS        Physical Exam:     Physical Exam:  Visit Vitals    /51 (BP 1 Location: Left arm)    Pulse 70    Temp 98.5 °F (36.9 °C)    Resp 18    Wt 60.1 kg (132 lb 9.6 oz)    SpO2 98%    BMI 22.76 kg/m2      O2 Device: Room air    Temp (24hrs), Av.3 °F (36.8 °C), Min:98 °F (36.7 °C), Max:98.5 °F (36.9 °C)             General:  Awake,  no distress. Head:  Normocephalic, without obvious abnormality, atraumatic. Eyes:  Conjunctivae/corneas clear, sclera anicteric, right corneal haze. Nose: Nares normal. No drainage or sinus tenderness. Throat: Lips, mucosa, and tongue normal.    Neck: Supple, symmetrical, trachea midline, no adenopathy. Lungs:   Crackles right lower lung. Heart:  Regular rate and rhythm, S1, S2 normal, no murmur, click, rub or gallop. Abdomen: Soft, non-tender. Bowel sounds normal. No masses,  No organomegaly. Extremities: Extremities normal, atraumatic, no cyanosis or edema. Capillary refill normal.   Pulses: 2+ and symmetric all extremities. Skin: Skin color pink, turgor normal. No rashes or lesions   Neurologic: Unable to test, patient not following commands. Residual right sided weakness.        Labs Reviewed:    CMP:   Lab Results   Component Value Date/Time     2017 02:30 PM    K 3.1 (L) 2017 02:30 PM     2017 02:30 PM    CO2 33 (H) 2017 02:30 PM    AGAP 5 2017 02:30 PM    GLU 95 2017 02:30 PM    BUN 37 (H) 07/03/2017 02:30 PM    CREA 1.59 (H) 07/03/2017 02:30 PM    GFRAA 37 (L) 07/03/2017 02:30 PM    GFRNA 30 (L) 07/03/2017 02:30 PM    CA 8.0 (L) 07/03/2017 02:30 PM    ALB 2.4 (L) 07/03/2017 02:30 PM    TP 6.6 07/03/2017 02:30 PM    GLOB 4.2 (H) 07/03/2017 02:30 PM    AGRAT 0.6 (L) 07/03/2017 02:30 PM    SGOT 29 07/03/2017 02:30 PM    ALT 20 07/03/2017 02:30 PM     CBC:   Lab Results   Component Value Date/Time    WBC 7.2 07/03/2017 02:30 PM    HGB 9.9 (L) 07/03/2017 02:30 PM    HCT 31.0 (L) 07/03/2017 02:30 PM     07/03/2017 02:30 PM         Procedures/imaging: see electronic medical records for all procedures/Xrays and details which were not copied into this note but were reviewed prior to creation of Plan        CC: Farhan Goncalves MD

## 2017-07-04 LAB
ANION GAP BLD CALC-SCNC: 5 MMOL/L (ref 3–18)
BUN SERPL-MCNC: 30 MG/DL (ref 7–18)
BUN/CREAT SERPL: 21 (ref 12–20)
CALCIUM SERPL-MCNC: 7.8 MG/DL (ref 8.5–10.1)
CHLORIDE SERPL-SCNC: 109 MMOL/L (ref 100–108)
CO2 SERPL-SCNC: 33 MMOL/L (ref 21–32)
CREAT SERPL-MCNC: 1.4 MG/DL (ref 0.6–1.3)
ERYTHROCYTE [DISTWIDTH] IN BLOOD BY AUTOMATED COUNT: 14.5 % (ref 11.6–14.5)
GLUCOSE SERPL-MCNC: 80 MG/DL (ref 74–99)
HCT VFR BLD AUTO: 28.1 % (ref 35–45)
HGB BLD-MCNC: 8.8 G/DL (ref 12–16)
MCH RBC QN AUTO: 30.4 PG (ref 24–34)
MCHC RBC AUTO-ENTMCNC: 31.3 G/DL (ref 31–37)
MCV RBC AUTO: 97.2 FL (ref 74–97)
PLATELET # BLD AUTO: 249 K/UL (ref 135–420)
PMV BLD AUTO: 8.6 FL (ref 9.2–11.8)
POTASSIUM SERPL-SCNC: 3 MMOL/L (ref 3.5–5.5)
RBC # BLD AUTO: 2.89 M/UL (ref 4.2–5.3)
SODIUM SERPL-SCNC: 147 MMOL/L (ref 136–145)
WBC # BLD AUTO: 5.8 K/UL (ref 4.6–13.2)

## 2017-07-04 PROCEDURE — 74011250637 HC RX REV CODE- 250/637: Performed by: HOSPITALIST

## 2017-07-04 PROCEDURE — 74011000258 HC RX REV CODE- 258: Performed by: HOSPITALIST

## 2017-07-04 PROCEDURE — 80048 BASIC METABOLIC PNL TOTAL CA: CPT | Performed by: HOSPITALIST

## 2017-07-04 PROCEDURE — 92610 EVALUATE SWALLOWING FUNCTION: CPT

## 2017-07-04 PROCEDURE — 36415 COLL VENOUS BLD VENIPUNCTURE: CPT | Performed by: HOSPITALIST

## 2017-07-04 PROCEDURE — 74011250636 HC RX REV CODE- 250/636: Performed by: HOSPITALIST

## 2017-07-04 PROCEDURE — 74011000250 HC RX REV CODE- 250: Performed by: HOSPITALIST

## 2017-07-04 PROCEDURE — 85027 COMPLETE CBC AUTOMATED: CPT | Performed by: HOSPITALIST

## 2017-07-04 PROCEDURE — 65270000029 HC RM PRIVATE

## 2017-07-04 PROCEDURE — 77030020887 HC CRM SKN PROT DIMTH S&N -A

## 2017-07-04 RX ORDER — POTASSIUM CHLORIDE AND SODIUM CHLORIDE 450; 150 MG/100ML; MG/100ML
INJECTION, SOLUTION INTRAVENOUS CONTINUOUS
Status: DISCONTINUED | OUTPATIENT
Start: 2017-07-04 | End: 2017-07-06

## 2017-07-04 RX ORDER — SODIUM CHLORIDE 900 MG/100ML
INJECTION INTRAVENOUS
Status: DISPENSED
Start: 2017-07-04 | End: 2017-07-05

## 2017-07-04 RX ADMIN — SPIRONOLACTONE 25 MG: 25 TABLET, FILM COATED ORAL at 09:00

## 2017-07-04 RX ADMIN — CLINDAMYCIN PHOSPHATE 300 MG: 150 INJECTION, SOLUTION, CONCENTRATE INTRAVENOUS at 19:52

## 2017-07-04 RX ADMIN — HEPARIN SODIUM 5000 UNITS: 5000 INJECTION, SOLUTION INTRAVENOUS; SUBCUTANEOUS at 17:21

## 2017-07-04 RX ADMIN — OXYBUTYNIN CHLORIDE 5 MG: 5 TABLET ORAL at 23:19

## 2017-07-04 RX ADMIN — ASPIRIN 81 MG 81 MG: 81 TABLET ORAL at 09:00

## 2017-07-04 RX ADMIN — HEPARIN SODIUM 5000 UNITS: 5000 INJECTION, SOLUTION INTRAVENOUS; SUBCUTANEOUS at 09:20

## 2017-07-04 RX ADMIN — CLINDAMYCIN PHOSPHATE 300 MG: 150 INJECTION, SOLUTION, CONCENTRATE INTRAVENOUS at 02:17

## 2017-07-04 RX ADMIN — CLINDAMYCIN PHOSPHATE 300 MG: 150 INJECTION, SOLUTION, CONCENTRATE INTRAVENOUS at 06:51

## 2017-07-04 RX ADMIN — SODIUM CHLORIDE AND POTASSIUM CHLORIDE: 4.5; 1.49 INJECTION, SOLUTION INTRAVENOUS at 13:51

## 2017-07-04 RX ADMIN — Medication 250 MG: at 23:19

## 2017-07-04 RX ADMIN — Medication 400 MG: at 09:00

## 2017-07-04 RX ADMIN — CLINDAMYCIN PHOSPHATE 300 MG: 150 INJECTION, SOLUTION, CONCENTRATE INTRAVENOUS at 13:51

## 2017-07-04 RX ADMIN — OXYBUTYNIN CHLORIDE 5 MG: 5 TABLET ORAL at 09:00

## 2017-07-04 RX ADMIN — FUROSEMIDE 20 MG: 20 TABLET ORAL at 09:00

## 2017-07-04 RX ADMIN — OXYBUTYNIN CHLORIDE 5 MG: 5 TABLET ORAL at 17:21

## 2017-07-04 RX ADMIN — MINERAL OIL, PETROLATUM: 425; 568 OINTMENT OPHTHALMIC at 17:21

## 2017-07-04 RX ADMIN — AMLODIPINE BESYLATE 5 MG: 5 TABLET ORAL at 09:00

## 2017-07-04 RX ADMIN — OMEPRAZOLE 20 MG: 20 CAPSULE, DELAYED RELEASE ORAL at 09:00

## 2017-07-04 RX ADMIN — HEPARIN SODIUM 5000 UNITS: 5000 INJECTION, SOLUTION INTRAVENOUS; SUBCUTANEOUS at 23:19

## 2017-07-04 RX ADMIN — LEVOTHYROXINE SODIUM 100 MCG: 100 TABLET ORAL at 07:30

## 2017-07-04 RX ADMIN — Medication 250 MG: at 09:00

## 2017-07-04 NOTE — PROGRESS NOTES
Patient is pleasantly confused. Satnam Michelle name \"Bibi\". Small open wound on sacrum, wound care consult active. POA is daughter [place]. 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 Unable to obtain updated medication list from [facility] [phone]. DId not send a copy on transfer to ER, requested fax last night, never received.

## 2017-07-04 NOTE — PROGRESS NOTES
Hospitalist Progress Note    Patient: Cely Guerin MRN: [id number]  CSN: [id number]    YOB: 1924  Age: 80 y.o. Sex: female    DOA: 7/3/2017 LOS:  LOS: 1 day                Assessment/Plan     Patient Active Problem List   Diagnosis Code    Failure to thrive in adult R62.7    Hx pulmonary embolism Z80.36    HTN (hypertension) I10    Weakness R53.1    Unspecified constipation K59.00    Hypoxia R09.02    Pneumonia J18.9    Dementia F36.80        81 yo female admitted for hypoxia, history of CVA with right sided weakness. Awake and answers to questions    Hypoxia - secondary to pneumonia, oxygen support. Wean off oxygen.     Pneumonia - likely aspiration given history of CVA. on levaquin and clindamycin as she is allergic to pcn. Seen by SLP, started on mechanical soft diet.      HTN - continue home medications.      She likely has CKD, follow renal function.     Dementia     Palliative care     PT/OT     DVT prophylaxis    Review of systems  General: No fevers or chills. Cardiovascular: No chest pain or pressure. No palpitations. Pulmonary: No shortness of breath. Gastrointestinal: No nausea, vomiting. Physical Exam:  General: Awake, cooperative, no acute distress    HEENT: NC, Atraumatic. Left corneal haze. anicteric sclerae. Lungs: CTA Bilaterally. No Wheezing/Rhonchi/Rales. Heart:  Regular  rhythm,  No murmur, No Rubs, No Gallops  Abdomen: Soft, Non distended, Non tender.  +Bowel sounds,   Extremities: No c/c/e  Psych:   Not anxious or agitated.   Neurologic:  Residual right sided weakness             Vital signs/Intake and Output:  Visit Vitals    /59    Pulse 78    Temp 99.3 °F (37.4 °C)    Resp 15    Ht 5' 4.17\" (1.63 m)    Wt 56.5 kg (124 lb 8 oz)    SpO2 96%    BMI 21.26 kg/m2     Current Shift:     Last three shifts:               Labs: Results:       Chemistry Recent Labs      07/04/17   0500  07/03/17   1430   GLU  80  95   NA  147*  144   K  3.0* 3.1*   CL  109*  106   CO2  33*  33*   BUN  30*  37*   CREA  1.40*  1.59*   CA  7.8*  8.0*   AGAP  5  5   BUCR  21*  23*   AP   --   205*   TP   --   6.6   ALB   --   2.4*   GLOB   --   4.2*   AGRAT   --   0.6*      CBC w/Diff Recent Labs      07/04/17   0500  07/03/17   1430   WBC  5.8  7.2   RBC  2.89*  3.22*   HGB  8.8*  9.9*   HCT  28.1*  31.0*   PLT  249  300   GRANS   --   65   LYMPH   --   22   EOS   --   6*      Cardiac Enzymes Recent Labs      07/03/17   1430   CPK  52   CKND1  4.6*      Coagulation No results for input(s): PTP, INR, APTT in the last 72 hours. No lab exists for component: INREXT    Lipid Panel No results found for: CHOL, CHOLPOCT, CHOLX, CHLST, CHOLV, 263099, HDL, LDL, LDLC, DLDLP, 371802, VLDLC, VLDL, TGLX, TRIGL, TRIGP, TGLPOCT, CHHD, CHHDX   BNP No results for input(s): BNPP in the last 72 hours.    Liver Enzymes Recent Labs      07/03/17   1430   TP  6.6   ALB  2.4*   AP  205*   SGOT  29      Thyroid Studies Lab Results   Component Value Date/Time    TSH 1.54 06/22/2012 12:50 AM        Procedures/imaging: see electronic medical records for all procedures/Xrays and details which were not copied into this note but were reviewed prior to creation of Plan

## 2017-07-04 NOTE — ROUTINE PROCESS
0730-Bedside and Verbal shift change report given to REILLY Deleon RN (oncoming nurse) by LULÚ Lane RN (offgoing nurse). Report included the following information SBAR, Kardex, Intake/Output and MAR.

## 2017-07-04 NOTE — ROUTINE PROCESS
Bedside shift change report given to Zoya Mike RN (oncoming nurse) by Isabelle Alvarez RN (offgoing nurse). Report included the following information SBAR, MAR and Recent Results     Alarm parameters reviewed, on and audible Appropriate for patient clinical condition  .

## 2017-07-04 NOTE — ROUTINE PROCESS
Bedside and Verbal shift change report given to LULÚ Ritter (oncoming nurse) by LULÚ Augustine RN (offgoing nurse). Report included the following information SBAR, Kardex, MAR and Recent Results.      Alarm parameters reviewed, on and audible Appropriate for patient clinical condition

## 2017-07-04 NOTE — PROGRESS NOTES
2911 - Patient in bed at this time. A/O x 1, self only. IV to left forearm  intact and patent. Radial and pedal pulses palpable. Lungs with crackles to bases. Bowel sounds active to all quadrants. Pain 0/10. Patient on [facility]. Patient is NPO until Speech evaluation. Patient did verbalize that she wanted her head up a little while in bed. Answers simple questions with yes or no, and follows simple commands. 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 1106-Daughter updated on patient's condition. Family in to visit.

## 2017-07-04 NOTE — PROGRESS NOTES
Shift assessment completed. Pt in bed. A/Ox2, disoriented to location and situation. Pain scale 00/10. Pt denies any SOB or difficulty breathing. Lung sound anterior clear, diminished posterior. Non productive cough. Pt denies any chest pain. Missing rt eye, glasses. Drooping to rt of face since birth. Bowel sounds active. Strong . Pt denies any numbness or tingling to extremities. Pt denies any calf pain. Pt cleaned, brief and pad changed ( assisted with aid), pt tolerated well. Redness of sacral area with open area. Pillows placed under rt side to relieve pressure.

## 2017-07-04 NOTE — PROGRESS NOTES
attempted an initial visit of the patient, who was sleeping. Spiritual Care materials left at bedside. Chaplains will continue to follow and will provide pastoral care as needed or requested. Joanna Smith Intern  [phone]

## 2017-07-04 NOTE — PROGRESS NOTES
Problem: Dysphagia (Adult)  Goal: *Acute Goals and Plan of Care (Insert Text)    Recommendations:  Diet: mechanical soft solids, honey thick liquids  Meds: per patient preference  Aspiration Precautions  Oral Care TID      Goals: Patient will:  1. Tolerate PO trials with 0 s/s overt distress in 4/5 trials  2. Utilize compensatory swallow strategies/maneuvers (decrease bite/sip, size/rate, alt. liq/sol) with min cues in 4/5 trials  3. Perform oral-motor/laryngeal exercises to increase oropharyngeal swallow function with min cues  4. Complete an objective swallow study (i.e., MBSS) to assess swallow integrity, r/o aspiration, and determine of safest LRD, min A  Outcome: Progressing Towards Goal  .  SPEECH LANGUAGE PATHOLOGY BEDSIDE SWALLOW EVALUATION     Patient: Laura Groves (80 y.o. female)  Date: 7/4/2017  Primary Diagnosis: Hypoxia        Precautions: confusion, h/o aspiration pneumonia         ASSESSMENT :  Based on the objective data described below, the patient presents with moderate oropharyngeal dysphagia. Pt evaluated at b/s with HOB elevated to 45*. Pt demonstrated delayed weak cough, decreased laryngeal elevation, anterior spillage with thin liquids and nectar thick liquids +/- straw across 3/3 trials. Pt demonstrated significant residue in right lateral sulci with regular solids. Educated pt and RN the importance of oral care TID and aspiration precautions across all meals to reduce s/sx of aspiration. At this time, recommend dental soft solids, honey thick liquids, aspiration precautions and oral care TID. D/w RN Frosty Brightly results from b/s swallow evaluation with verbal understanding noted. Patient will benefit from skilled intervention to address the above impairments.   Patients rehabilitation potential is considered to be Fair  Factors which may influence rehabilitation potential include:   [ ]            None noted  [X]            Mental ability/status  [X]            Medical condition  [ ] Home/family situation and support systems  [ ]            Safety awareness  [ ]            Pain tolerance/management  [ ]            Other:        PLAN :  Recommendations and Planned Interventions:  Dental soft solids, honey thick liquids, MBSS next business day  Frequency/Duration: Patient will be followed by speech-language pathology 1-2 times per day/4-7 days per week to address goals. Discharge Recommendations: Skilled Nursing Facility       SUBJECTIVE:   Patient stated I'm here. OBJECTIVE:       Past Medical History:   Diagnosis Date    Anemia NEC      Dementia      Hearing reduced      Hypertension      Shingles      Stroke [facility])       Past Surgical History:   Procedure Laterality Date    ABDOMEN SURGERY PROC UNLISTED        HX CHOLECYSTECTOMY        HX GYN        HX HYSTERECTOMY         Prior Level of Function/Home Situation:   Home Situation  Home Environment: Assisted living  [facility] [address]  One/Two Story Residence: One story  Living Alone: No  Support Systems: Child(adams)  Patient Expects to be Discharged to[de-identified] Skilled nursing facility  Current DME Used/Available at Home: None  Diet prior to admission: regular solids, thin liquids  Current Diet:  Dental soft solids, honey-thick liquids   Cognitive and Communication Status:  Neurologic State: Eyes open to voice, Confused, Alert  Orientation Level: Oriented to person  Cognition: Poor safety awareness, Impaired decision making, Decreased command following  Perception: Appears intact  Perseveration: No perseveration noted     Oral Assessment:  Oral Assessment  Labial: Right droop; Decreased seal;Impaired coordination  Dentition: Intact  Oral Hygiene: WFL  Lingual: Decreased strength;Right deviation  Velum: Unable to visualize  Mandible: No impairment      P.O. Trials:  Patient Position: [facility] 45*  Vocal quality prior to P.O.: Low volume; Fatigue  Consistency Presented: Thin liquid; Nectar thick liquid;Honey thick liquid;Puree;Mechanical soft; Solid  How Presented: Self-fed/presented;Cup/sip;Straw;Successive swallows     Bolus Acceptance: No impairment  Bolus Formation/Control: Impaired  Type of Impairment: Delayed;Mastication  Propulsion: Delayed (# of seconds)  Oral Residue: 10-50% of bolus  Initiation of Swallow: Delayed (# of seconds)  Laryngeal Elevation: Weak  Aspiration Signs/Symptoms: Weak cough;Clear throat  Pharyngeal Phase Characteristics: Easily fatigued ; Suspected pharyngeal residue;Poor endurance  Effective Modifications: Alternate liquids/solids;Cup/sip;Small sips and bites  Cues for Modifications: Moderate-maximal        Oral Phase Severity: Moderate  Pharyngeal Phase Severity : Moderate     GCODESwallowing:  Swallow Current Status CL= 60-79%   GCODESwallowing:  Swallow Goal Status CJ= 20-39%        The severity rating is based on the following outcomes:             Clinical Judgment     Pain:  Pt c/o 0/10 pain prior to evaluation. Pt c/o 0/10 pain post evaluation. After treatment:   [ ]            Patient left in no apparent distress sitting up in chair  [X]            Patient left in no apparent distress in bed  [X]            Call bell left within reach  [X]            Nursing notified  [ ]            Caregiver present  [ ]            Bed alarm activated      COMMUNICATION/EDUCATION:   [X]            SLP educated pt with regard to compensatory swallow strategies and                  aspiration/reflux precautions including: small bites/sips,                  alternate liquids/solids, decrease feeding rate, HOB > 45 with all po, and                   upright in bed at 30 degrees after po for at least 45 minutes. [X]            Patient/family have participated as able in goal setting and plan of care. [X]            Patient/family agree to work toward stated goals and plan of care. [X]            Patient understands intent and goals of therapy; neutral about participation.   [ ] Patient is unable to participate in goal setting and plan of care. Thank you for this referral.  Autumn DARLING [address] [facility] Pathologist ROSALINE VILLARREAL CCC-SLP

## 2017-07-04 NOTE — ROUTINE PROCESS
Bedside and Verbal shift change report given to MONROE Molina RN (oncoming nurse) by Berna Costa RN (offgoing nurse). Report included the following information SBAR, Kardex, Intake/Output and MAR.

## 2017-07-05 PROBLEM — J69.0 ASPIRATION PNEUMONIA (HCC): Status: ACTIVE | Noted: 2017-07-03

## 2017-07-05 LAB
ANION GAP BLD CALC-SCNC: 6 MMOL/L (ref 3–18)
BUN SERPL-MCNC: 25 MG/DL (ref 7–18)
BUN/CREAT SERPL: 18 (ref 12–20)
CALCIUM SERPL-MCNC: 7.8 MG/DL (ref 8.5–10.1)
CHLORIDE SERPL-SCNC: 108 MMOL/L (ref 100–108)
CO2 SERPL-SCNC: 31 MMOL/L (ref 21–32)
CREAT SERPL-MCNC: 1.37 MG/DL (ref 0.6–1.3)
ERYTHROCYTE [DISTWIDTH] IN BLOOD BY AUTOMATED COUNT: 14.5 % (ref 11.6–14.5)
FOLATE SERPL-MCNC: >20 NG/ML (ref 3.1–17.5)
GLUCOSE SERPL-MCNC: 86 MG/DL (ref 74–99)
HCT VFR BLD AUTO: 28.8 % (ref 35–45)
HGB BLD-MCNC: 8.8 G/DL (ref 12–16)
IRON SATN MFR SERPL: 30 %
IRON SERPL-MCNC: 34 UG/DL (ref 50–175)
MCH RBC QN AUTO: 29.9 PG (ref 24–34)
MCHC RBC AUTO-ENTMCNC: 30.6 G/DL (ref 31–37)
MCV RBC AUTO: 98 FL (ref 74–97)
PLATELET # BLD AUTO: 255 K/UL (ref 135–420)
PMV BLD AUTO: 8.8 FL (ref 9.2–11.8)
POTASSIUM SERPL-SCNC: 3.2 MMOL/L (ref 3.5–5.5)
RBC # BLD AUTO: 2.94 M/UL (ref 4.2–5.3)
SODIUM SERPL-SCNC: 145 MMOL/L (ref 136–145)
TIBC SERPL-MCNC: 114 UG/DL (ref 250–450)
VIT B12 SERPL-MCNC: >2000 PG/ML (ref 211–911)
WBC # BLD AUTO: 5.7 K/UL (ref 4.6–13.2)

## 2017-07-05 PROCEDURE — 74011250637 HC RX REV CODE- 250/637: Performed by: HOSPITALIST

## 2017-07-05 PROCEDURE — 76450000000

## 2017-07-05 PROCEDURE — 97162 PT EVAL MOD COMPLEX 30 MIN: CPT

## 2017-07-05 PROCEDURE — 65270000029 HC RM PRIVATE

## 2017-07-05 PROCEDURE — 80048 BASIC METABOLIC PNL TOTAL CA: CPT | Performed by: HOSPITALIST

## 2017-07-05 PROCEDURE — 83540 ASSAY OF IRON: CPT | Performed by: HOSPITALIST

## 2017-07-05 PROCEDURE — 74011000258 HC RX REV CODE- 258: Performed by: HOSPITALIST

## 2017-07-05 PROCEDURE — 74011000250 HC RX REV CODE- 250: Performed by: HOSPITALIST

## 2017-07-05 PROCEDURE — 74011250636 HC RX REV CODE- 250/636: Performed by: HOSPITALIST

## 2017-07-05 PROCEDURE — 77030033263 HC DRSG MEPILEX 16-48IN BORD MOLN -B

## 2017-07-05 PROCEDURE — 36415 COLL VENOUS BLD VENIPUNCTURE: CPT | Performed by: HOSPITALIST

## 2017-07-05 PROCEDURE — 85027 COMPLETE CBC AUTOMATED: CPT | Performed by: HOSPITALIST

## 2017-07-05 PROCEDURE — 97161 PT EVAL LOW COMPLEX 20 MIN: CPT

## 2017-07-05 PROCEDURE — 82607 VITAMIN B-12: CPT | Performed by: HOSPITALIST

## 2017-07-05 RX ORDER — MENTHOL AND ZINC OXIDE .44; 20.625 G/100G; G/100G
OINTMENT TOPICAL 2 TIMES DAILY
COMMUNITY
End: 2017-07-08

## 2017-07-05 RX ORDER — AMLODIPINE BESYLATE 5 MG/1
10 TABLET ORAL DAILY
Status: DISCONTINUED | OUTPATIENT
Start: 2017-07-06 | End: 2017-07-08 | Stop reason: HOSPADM

## 2017-07-05 RX ORDER — POTASSIUM CHLORIDE 20 MEQ/1
40 TABLET, EXTENDED RELEASE ORAL
Status: COMPLETED | OUTPATIENT
Start: 2017-07-05 | End: 2017-07-05

## 2017-07-05 RX ORDER — HYDROCORTISONE 25 MG/G
CREAM TOPICAL
COMMUNITY
End: 2017-07-08

## 2017-07-05 RX ORDER — DOXYLAMINE SUCCINATE 25 MG
TABLET ORAL 3 TIMES DAILY
COMMUNITY
End: 2017-07-08

## 2017-07-05 RX ORDER — LOSARTAN POTASSIUM 25 MG/1
TABLET ORAL DAILY
COMMUNITY

## 2017-07-05 RX ORDER — BACITRACIN 500 [USP'U]/G
OINTMENT OPHTHALMIC
COMMUNITY

## 2017-07-05 RX ORDER — POLYETHYLENE GLYCOL 3350 17 G/17G
POWDER, FOR SOLUTION ORAL
COMMUNITY
Start: 2017-06-12 | End: 2017-07-08

## 2017-07-05 RX ORDER — OXYBUTYNIN CHLORIDE 5 MG/1
TABLET, EXTENDED RELEASE ORAL
COMMUNITY
Start: 2017-06-06 | End: 2017-07-08

## 2017-07-05 RX ORDER — LEVOTHYROXINE SODIUM 137 UG/1
TABLET ORAL
COMMUNITY
Start: 2017-06-06 | End: 2017-07-08

## 2017-07-05 RX ADMIN — HEPARIN SODIUM 5000 UNITS: 5000 INJECTION, SOLUTION INTRAVENOUS; SUBCUTANEOUS at 16:36

## 2017-07-05 RX ADMIN — CLINDAMYCIN PHOSPHATE 300 MG: 150 INJECTION, SOLUTION, CONCENTRATE INTRAVENOUS at 21:24

## 2017-07-05 RX ADMIN — OXYBUTYNIN CHLORIDE 5 MG: 5 TABLET ORAL at 09:05

## 2017-07-05 RX ADMIN — POTASSIUM CHLORIDE 40 MEQ: 20 TABLET, EXTENDED RELEASE ORAL at 16:37

## 2017-07-05 RX ADMIN — AMLODIPINE BESYLATE 5 MG: 5 TABLET ORAL at 09:02

## 2017-07-05 RX ADMIN — Medication 250 MG: at 21:24

## 2017-07-05 RX ADMIN — LEVOTHYROXINE SODIUM 100 MCG: 100 TABLET ORAL at 06:51

## 2017-07-05 RX ADMIN — OXYBUTYNIN CHLORIDE 5 MG: 5 TABLET ORAL at 21:24

## 2017-07-05 RX ADMIN — CLINDAMYCIN PHOSPHATE 300 MG: 150 INJECTION, SOLUTION, CONCENTRATE INTRAVENOUS at 15:51

## 2017-07-05 RX ADMIN — OMEPRAZOLE 20 MG: 20 CAPSULE, DELAYED RELEASE ORAL at 09:08

## 2017-07-05 RX ADMIN — HEPARIN SODIUM 5000 UNITS: 5000 INJECTION, SOLUTION INTRAVENOUS; SUBCUTANEOUS at 09:08

## 2017-07-05 RX ADMIN — OXYBUTYNIN CHLORIDE 5 MG: 5 TABLET ORAL at 16:37

## 2017-07-05 RX ADMIN — CLINDAMYCIN PHOSPHATE 300 MG: 150 INJECTION, SOLUTION, CONCENTRATE INTRAVENOUS at 01:38

## 2017-07-05 RX ADMIN — SODIUM CHLORIDE AND POTASSIUM CHLORIDE: 4.5; 1.49 INJECTION, SOLUTION INTRAVENOUS at 21:24

## 2017-07-05 RX ADMIN — ASPIRIN 81 MG 81 MG: 81 TABLET ORAL at 09:05

## 2017-07-05 RX ADMIN — HEPARIN SODIUM 5000 UNITS: 5000 INJECTION, SOLUTION INTRAVENOUS; SUBCUTANEOUS at 23:28

## 2017-07-05 RX ADMIN — LEVOFLOXACIN 750 MG: 5 INJECTION, SOLUTION INTRAVENOUS at 16:36

## 2017-07-05 RX ADMIN — SPIRONOLACTONE 25 MG: 25 TABLET, FILM COATED ORAL at 09:02

## 2017-07-05 RX ADMIN — Medication 250 MG: at 09:03

## 2017-07-05 RX ADMIN — SODIUM CHLORIDE AND POTASSIUM CHLORIDE: 4.5; 1.49 INJECTION, SOLUTION INTRAVENOUS at 04:53

## 2017-07-05 RX ADMIN — MINERAL OIL, PETROLATUM: 425; 568 OINTMENT OPHTHALMIC at 18:16

## 2017-07-05 RX ADMIN — Medication 400 MG: at 09:03

## 2017-07-05 RX ADMIN — CLINDAMYCIN PHOSPHATE 300 MG: 150 INJECTION, SOLUTION, CONCENTRATE INTRAVENOUS at 06:51

## 2017-07-05 NOTE — PALLIATIVE CARE
[address]    [phone]    Date Consult Received: 07/05/17  Consult placed by:  Dr. Jose Hernández  Reason for consult: Goals of care discussion    Patient summary:  Leighann Brooks is a 80y.o. year old with a past history of CVA with right sided deficits, dementia, HTN, who was admitted on 7/3/2017 from / [place] [address] with a diagnosis of Hypoxia - secondary to pneumonia,  Pneumonia - likely aspiration given history of CVA,  HTN, She likely has CKD, Dementia. She is currently in this location: 329/01.     Is this Primary Palliative Care?  __ Basic pain management   __ Initial resuscitation discussion  __ Routine advance care planning (advance directive, healthcare power of )   __ Basic questions about hospice benefit/services  __ Entering and managing comfort care orders for patients who are comfort care only  __ Following through with details of post-discharge disposition once goals are clear    IF YES,  ACTION:     Is this consultation out of scope of Palliative Medicine?   __ Chronic nonmalignant pain with no serious/life-limiting illness   __ Assessment and management of a substance-abuse disorder    IF YES, ACTION:    Is this a specialty Palliative Medicine Consultation?  __ Advanced pain management  in patients with advanced illness  __ Communication about prognosis in advanced illness including care options  __ Complex resuscitation discussions  __ ED consultations that by addressing care goals may impact location of admission  __ Family meeting in ICU patients with limited prognosis or advanced illness   __ Psychosocial and spiritual support for patients and families with distress from advanced illness  X Goals of care discussion for patient with advanced illness, age  Is this consultation:  __ Emergent*  PM Team notified to see by close of business day*  *Patient experiencing intolerable escalating symptoms due to advanced illness    __ Urgent**  PM Team notified to see within 24 hours   **Emergent or Urgent is NOT based on time of discharge    X Routine  PM Team reviews medical information, introduces service, and schedules meeting time within 72 hours    ACTION/Notes:    Palliative medicine consult noted and appreciated. PM team members, Ivan Simpson RN and Abigail PATINO visited briefly this morning. Mrs. Shiela Wolf was reclining in her bed when we entered the room. She was cooperative and pleasant. She was aware that she was at [facility] but was not sure why she was here. She was not able to tell us where she was before she came to the hospital. She did deny any pain, discomfort or shortness of breath. Crackling noted in bilateral lung fields. Breath sounds diminished bilaterally as well. O2 in use per nasal cannula. Spoke with patients daughter, Cristy Cortes, by phone, DaughterVíctor cell [phone], home [phone]. She will meet with PM team today at 11:30 for goals of care discussion. Full note to follow this meeting.      Ivan Simpson RN

## 2017-07-05 NOTE — WOUND CARE
Pt seen by wound care for consult for wound present on admission. Pt with stage 2 to sacrum, 0.5 x 0.5 x 0.2. Prima dressing applied, pt tolerated well. Wound care remains available as needed.

## 2017-07-05 NOTE — ROUTINE PROCESS
Bedside shift change report given to Sherif Denise RN (oncoming nurse) by Jimmie Kraus RN  (offgoing nurse). Report included the following information SBAR, Kardex and MAR.

## 2017-07-05 NOTE — PROGRESS NOTES
D/C Plan: SNF ([facility]) 7/6/17      Pt admitted due to pneumonia. Pt with a history of dementia and CVA. Pt is a resident at [facility] and POA/Daughter, Marisa Reno ([phone] ; [phone]), has indicated plan is for pt to go to SNF with a transition to LTC. Offered FOC and a list of area facilities. POA has indicated pt has been to [facility] in the past and would like clinical information sent to these facilities. [facility] is their first choice. CMS has been notified and will assist.  Pt has been accepted to [place] [address] for admission. Pt can transition to this facility as early as 7/6/17 following her 3 midnights. Contacted [facility] and have been notified a UAI has been completed NN  in mid June of this year. CM has requested a copy of the UAI to assist with transition. CM to continue to follow and assist.  Medical Transport will have to be arranged to [place] [address] ([address]). Please call report to [phone] to ensure a safe transition. Readmission Risk Assessment:     Moderate Risk and MSSP/Good Help ACO patients    RRAT Score:  13-20    Initial Assessment:  SNF to LTC     Emergency Contact:  Daughter/POA    Pertinent Medical Hx:     Dementia, CVA    PCP/Specialists:  [employer] Services:   [facility]    DME:          Moderate Risk Care Transition Plan:  1. Evaluate for [facility] or [facility], SNF, acute rehab, community care coordination of resources. 2. Involve patient/caregiver in assessment, planning, education and implement of intervention. 3. CM daily patient care huddles/interdisciplinary rounds. 4. PCP/Specialist appointment within 5  7 days made prior to discharge. 5. Facilitate transportation and logistics for follow-up appointments. 6. Medication reconciliation [id number] [facility]  7.  Formal handoff between hospital provider and post-acute provider to transition patient  Handoff to [facility] Nurse Navigator or PCP practice. Care Management Interventions  PCP Verified by CM:  Yes  Mode of Transport at Discharge: BLS  Transition of Care Consult (CM Consult): SNF, Long Term Care  Health Maintenance Reviewed: Yes  Physical Therapy Consult: Yes  Occupational Therapy Consult: Yes  Current Support Network: Assisted Living, Family Lives Nearby  Confirm Follow Up Transport: Family  Plan discussed with Pt/Family/Caregiver: Yes  Freedom of Choice Offered: Yes  Discharge Location  Discharge Placement: Skilled nursing facility

## 2017-07-05 NOTE — CONSULTS
[facility]: [phone])  [facility]: [phone]   [facility]: [phone]    Patient Name: Daniel Ramos  YOB: 1924    Date of Initial Consult: 7/5/17  Reason for Consult: care decisions  Requesting Provider: Dr. Sosa [facility]   Primary Care Physician: Leah Wilson MD      SUMMARY:   Daniel Ramos is a 80y.o. year old with a past history of advanced dementia, recurrent UTIs,  CVA, HTN, who was admitted on 7/3/2017 from [address] [place] [address] w RLL pneumonia of aspiration origin. Very confused initially with acute on chronic renal failure. Has been at [facility] about three years, lived alone before that after 's death in 2002. No longer walking or consistently feeding herself. Dependent in toileting and bathing. She has two daughter, both local. Guillermo Doyle is HCPOA. Patient is feeling much better w/ hydration, denies complaints, would like \"some popcorn\". SLP swallowing assessment pending. Oriented to person only. Has an AMD that daughter will bring in. PALLIATIVE DIAGNOSES:     Patient Active Problem List   Diagnosis Code    Failure to thrive in adult R62.7    Hx pulmonary embolism Z86.711    HTN (hypertension) I10    Debility, unspecified R53.81    Unspecified constipation K59.00    Hypoxia R09.02    Aspiration pneumonia ([facility] [address]- RLL J69.0    Dementia F03.90     1. PLAN:   1. Met with daughter Oscar Gilbert and described clinical course and expected complication of disease and stage of her dementia. She relates her mother's clinical decline in last several months. She had always been very independent and the daughter is saddened by the change she has seen. She believes her mother would not want aggressive therapies to prolong her life if she is cannot get better. She would not wish intubation, tube feeding or repeated hospitalizations. She has led a \"good life\" and now would wish to focus on comfort.  They had already been pursuing an ICF medicaid placement and would like to know about a comfort care plan administered by Hospice if she is placed in LTC. Reviewed recommendations regarding artificial hydration/nutrition in advanced dementia. After discussing dementia and role of CPR, she is convinced her mother would not want this. DDNR executed. She would like to complete course of antibiotic and discharge to ICF w/ hospice. Questions answered. Hospice consult placed. 2. Consider stopping oxybutinin- Beer[facility] list med and patient is already incontinent, unlikely to be providing any clinical benefit. 3. Initial consult note routed to primary continuity provider  4.  Communicated plan of care with: Palliative IDT       GOALS OF CARE:   Limited curative with hospice d/c    Advance Care Planning 7/5/2017   Patient's Healthcare Decision Maker is: Legal Next Mark La   Primary Decision Maker Name Stephane Gordon   Primary Decision Maker Phone Number [phone]   Primary Decision Maker Relationship to Patient Adult child   Confirm Advance Directive Yes, not on file   Does the patient have other document types Do Not Resuscitate           HISTORY:     History obtained from: chart, family    CHIEF COMPLAINT: see summary    HPI/SUBJECTIVE:    The patient is:   [] Verbal and participatory  [x] Non-participatory due to:   dementia     Clinical Pain Assessment (nonverbal scale for nonverbal patients): Pain: 0         FUNCTIONAL ASSESSMENT:     Palliative Performance Scale (PPS):  PPS: 30       PSYCHOSOCIAL/SPIRITUAL SCREENING:     Advance Care Planning:  Advance Care Planning 7/5/2017   Patient's Healthcare Decision Maker is: Legal Next of Malik La   Primary Decision Maker Name Stephane Gordon   Primary Decision Maker Phone Number [phone]   Primary Decision Maker Relationship to Patient Adult child   Confirm Advance Directive Yes, not on file   Does the patient have other document types Do Not Resuscitate        Any spiritual / Baptism concerns:  [] Yes /  [x] No    Caregiver Burnout:  [] Yes /  [x] No /  [] No Caregiver Present      Anticipatory grief assessment:   [x] Normal  / [] Maladaptive       ESAS Anxiety: Anxiety: 0    ESAS Depression:          REVIEW OF SYSTEMS:     Positive and pertinent negative findings in ROS are noted above in HPI. The following systems were [x] reviewed / [] unable to be reviewed as noted in HPI  Other findings are noted below. Systems: constitutional, ears/nose/mouth/throat, respiratory, gastrointestinal, genitourinary, musculoskeletal, integumentary, neurologic, psychiatric, endocrine. Positive findings noted below. Modified ESAS Completed by: provider           Pain: 0   Anxiety: 0 Nausea: 0   Anorexia: 0 Dyspnea: 0     Constipation: Yes     Stool Occurrence(s): 0        PHYSICAL EXAM:     Wt Readings from Last 3 Encounters:   07/05/17 56.4 kg (124 lb 6.4 oz)   02/06/17 57.2 kg (126 lb)   04/22/15 61.7 kg (136 lb)     Blood pressure 149/45, pulse 69, temperature 98.5 °F (36.9 °C), resp. rate 16, height 5' 4.17\" (1.63 m), weight 56.4 kg (124 lb 6.4 oz), SpO2 100 %.   Pain:  Pain Scale 1: Visual  Pain Intensity 1: 0                 Last bowel movement:     Constitutional: elderly, NAD  Eyes: pupils equal, anicteric  ENMT: no nasal discharge, moist mucous membranes  Cardiovascular: regular rhythm, distal pulses intact  Respiratory: breathing not labored, symmetric  Gastrointestinal: soft non-tender, +bowel sounds  Musculoskeletal: no deformity, no tenderness to palpation  Skin: warm, dry  Neurologic: following simple commands, moving all extremities  Psychiatric: pleasantly confused  Other:       HISTORY:     Principal Problem:    Aspiration pneumonia ([facility] [address]- RLL (7/3/2017)    Active Problems:    Failure to thrive in adult (7/1/2012)      HTN (hypertension) (7/1/2012)      Debility, unspecified (7/3/2012)      Hypoxia (7/3/2017)      Dementia ()      Past Medical History:   Diagnosis Date    Anemia NEC     Dementia     Hearing reduced     Hypertension     Shingles     Stroke [facility])       Past Surgical History:   Procedure Laterality Date    ABDOMEN SURGERY PROC UNLISTED      HX CHOLECYSTECTOMY      HX GYN      HX HYSTERECTOMY        History reviewed. No pertinent family history. History reviewed, no pertinent family history.   Social History   Substance Use Topics    Smoking status: Former Smoker    Smokeless tobacco: Never Used    Alcohol use No     Allergies   Allergen Reactions    Penicillins Hives      Current Facility-Administered Medications   Medication Dose Route Frequency    potassium chloride (K-DUR, KLOR-CON) SR tablet 40 mEq  40 mEq Oral NOW    [START ON 7/6/2017] amLODIPine (NORVASC) tablet 10 mg  10 mg Oral DAILY    0.45% sodium chloride with KCl 20 mEq/L infusion   IntraVENous CONTINUOUS    aspirin chewable tablet 81 mg  81 mg Oral DAILY    white petrolatum-mineral oil (LACRILUBE S.O.P.) ointment   Ophthalmic QPM    levothyroxine (SYNTHROID) tablet 100 mcg  100 mcg Oral ACB    magnesium oxide (MAG-OX) tablet 400 mg  400 mg Oral DAILY    omeprazole (PRILOSEC) capsule 20 mg  20 mg Oral DAILY    oxybutynin (DITROPAN) tablet 5 mg  5 mg Oral TID    spironolactone (ALDACTONE) tablet 25 mg  25 mg Oral DAILY    sodium chloride (NS) flush 5-10 mL  5-10 mL IntraVENous Q8H    sodium chloride (NS) flush 5-10 mL  5-10 mL IntraVENous PRN    levoFLOXacin (LEVAQUIN) 750 mg in D5W IVPB  750 mg IntraVENous Q48H    heparin (porcine) injection 5,000 Units  5,000 Units SubCUTAneous Q8H    Saccharomyces boulardii (FLORASTOR) capsule 250 mg  250 mg Oral BID    clindamycin phosphate (CLEOCIN) 300 mg in 0.9% sodium chloride (MBP/ADV) 100 mL ADV  300 mg IntraVENous Q6H        LAB AND IMAGING FINDINGS:     Lab Results Component Value Date/Time    WBC 5.7 07/05/2017 05:59 AM    HGB 8.8 07/05/2017 05:59 AM    PLATELET 247 46/36/9776 05:59 AM     Lab Results   Component Value Date/Time    Sodium 145 07/05/2017 05:59 AM    Potassium 3.2 07/05/2017 05:59 AM    Chloride 108 07/05/2017 05:59 AM    CO2 31 07/05/2017 05:59 AM    BUN 25 07/05/2017 05:59 AM    Creatinine 1.37 07/05/2017 05:59 AM    Calcium 7.8 07/05/2017 05:59 AM    Magnesium 2.2 04/10/2010 04:30 AM      Lab Results   Component Value Date/Time    AST (SGOT) 29 07/03/2017 02:30 PM    Alk.  phosphatase 205 07/03/2017 02:30 PM    Protein, total 6.6 07/03/2017 02:30 PM    Albumin 2.4 07/03/2017 02:30 PM    Globulin 4.2 07/03/2017 02:30 PM     Lab Results   Component Value Date/Time    INR 1.0 08/12/2016 04:25 PM    Prothrombin time 12.5 08/12/2016 04:25 PM    aPTT 28.9 08/12/2016 04:25 PM      No results found for: IRON, FE, TIBC, IBCT, PSAT, FERR   No results found for: PH, PCO2, PO2  No components found for: [place]   Lab Results   Component Value Date/Time    CK 52 07/03/2017 02:30 PM    CK - MB 2.4 07/03/2017 02:30 PM              Total time: 79 raj  Counseling / coordination time, spent as noted above: 50  > 50% counseling / coordination        Derrick Spencer MD  Palliative Medicine

## 2017-07-05 NOTE — ROUTINE PROCESS
Bedside and Verbal shift change report given to DUNIA So RN (oncoming nurse) by Zuleyka Khalil (offgoing nurse). Report included the following information SBAR, Kardex, Intake/Output and MAR.

## 2017-07-05 NOTE — PROGRESS NOTES
Shift Summary:  Patient oriented to self only. Without sx of pain or discomfort overnight. Patient with pinhole size wound to sacral area, and mepilex applied. Wound care to see patient today.

## 2017-07-05 NOTE — PROGRESS NOTES
Hospitalist Progress Note    Patient: Peewee Sagastume MRN: [id number]  CSN: [id number]    YOB: 1924  Age: 80 y.o.   Sex: female    DOA: 7/3/2017 LOS:  LOS: 2 days          Chief Complaint:    pneumonia      Assessment/Plan     Pneumonia, poss aspiration-on appropriate IV abx, no fevers, no resp distress, for MBS to eval for aspiration risks  Hx CVA  Dementia  anemia  Weakness-PT consult  uncontrolled HTN-increase norvasc to 10 mg daily  Hypoxia-resolved, 02 support PRN  Hypokalemia-replete with IVF as well as PO Kdur as tolerated-orders placed    DVT prophylaxis  For anemia-check iron, B12 and folate levels  Continue Iv antibiotics  D/c plan-work with POA-she is from [facility] care team to meet with dtr on goals of care    Patient Active Problem List   Diagnosis Code    Failure to thrive in adult R62.7    Hx pulmonary embolism Z80.36    HTN (hypertension) I10    Weakness R53.1    Unspecified constipation K59.00    Hypoxia R09.02    Pneumonia J18.9    Dementia F03.90       Subjective:    Seems confused  Cannot name how many children she has  Denies pain, SOB, stated she has no cough  No abd pain    Review of systems:    Constitutional: denies fevers, chills, myalgias  Respiratory: denies SOB, cough  Cardiovascular: denies chest pain, palpitations  Gastrointestinal: denies nausea, vomiting, diarrhea      Vital signs/Intake and Output:  Visit Vitals    /53 (BP 1 Location: Right arm, BP Patient Position: At rest)    Pulse 69    Temp 98.2 °F (36.8 °C)    Resp 16    Ht 5' 4.17\" (1.63 m)    Wt 56.4 kg (124 lb 6.4 oz)    SpO2 98%    BMI 21.24 kg/m2     Current Shift:     Last three shifts:  07/03 1901 - 07/05 0700  In: 650 [I.V.:650]  Out: 0     Exam:    General: frail elderly WF, alert, NAD, OX1  Head/Neck: NCAT, supple, No masses, No lymphadenopathy  CVS:Regular rate and rhythm, no M/R/G, S1/S2 heard, no thrill  Lungs:Clear to auscultation bilaterally, no wheezes, rhonchi, or rales  Abdomen: Soft, Nontender, No distention, Normal Bowel sounds, No hepatomegaly  Extremities: No C/C/E, pulses palpable 2+  Skin:normal texture and turgor, no rashes, no lesions  Neuro:facial droop, chronic  Psych:appropriate but flat affect                Labs: Results:       Chemistry Recent Labs      07/05/17   0559  07/04/17   0500  07/03/17   1430   GLU  86  80  95   NA  145  147*  144   K  3.2*  3.0*  3.1*   CL  108  109*  106   CO2  31  33*  33*   BUN  25*  30*  37*   CREA  1.37*  1.40*  1.59*   CA  7.8*  7.8*  8.0*   AGAP  6  5  5   BUCR  18  21*  23*   AP   --    --   205*   TP   --    --   6.6   ALB   --    --   2.4*   GLOB   --    --   4.2*   AGRAT   --    --   0.6*      CBC w/Diff Recent Labs      07/05/17   0559  07/04/17   0500  07/03/17   1430   WBC  5.7  5.8  7.2   RBC  2.94*  2.89*  3.22*   HGB  8.8*  8.8*  9.9*   HCT  28.8*  28.1*  31.0*   PLT  255  249  300   GRANS   --    --   65   LYMPH   --    --   22   EOS   --    --   6*      Cardiac Enzymes Recent Labs      07/03/17   1430   CPK  52   CKND1  4.6*      Coagulation No results for input(s): PTP, INR, APTT in the last 72 hours. No lab exists for component: INREXT    Lipid Panel No results found for: CHOL, CHOLPOCT, CHOLX, CHLST, CHOLV, 239818, HDL, LDL, LDLC, DLDLP, 432584, VLDLC, VLDL, TGLX, TRIGL, TRIGP, TGLPOCT, CHHD, CHHDX   BNP No results for input(s): BNPP in the last 72 hours.    Liver Enzymes Recent Labs      07/03/17   1430   TP  6.6   ALB  2.4*   AP  205*   SGOT  29      Thyroid Studies Lab Results   Component Value Date/Time    TSH 1.54 06/22/2012 12:50 AM        Procedures/imaging: see electronic medical records for all procedures/Xrays and details which were not copied into this note but were reviewed prior to creation of Spencer Mascorro MD

## 2017-07-05 NOTE — ACP (ADVANCE CARE PLANNING)
Daughter, Monty Perdomo states she is MPOA. Requested that she provide a copy of AMD.  DDNR signed by daughter. Copy placed in medical record. Original returned to patient.

## 2017-07-05 NOTE — PROGRESS NOTES
Problem: Mobility Impaired (Adult and Pediatric)  Goal: *Acute Goals and Plan of Care (Insert Text)  Outcome: Not Met  PHYSICAL THERAPY EVALUATION AND DISCHARGE     Patient: Tino Espinosa (80 y.o. female)  Date: 7/5/2017  Primary Diagnosis: Hypoxia        Precautions: Fall         ASSESSMENT AND RECOMMENDATIONS:  Based on the objective data described below, the patient presents with total assistance in order to complete ADLs. Pt was seen w/ IV, supplemental O2, and B/L LE elevated. Pt is oriented to self but disoriented to time, place, and situation. Pt follows commands but requires constant VCing to complete task secondary to possible pt's hearing impairment. PT attempted to stand pt 2x w/ RW but pt demonstrates increase B/L LE weakness and posterior center of gravity. Pt was transferred back to bed after treatment, all bed rails up, w/ call bell and tray in reach, nurse notifed after session. Pt's daughter was contacted after session, and it was reported that pt is currently getting transferred to a new assisted living facility and that pt is w/c bound and requires total assistance w/ feeding, dressing and to perform transfers. Pt at this time is not a candidate for PT secondary to pt being at baseline at this time and requiring total care to complete task. No further services needed at this time, DC from acute PT. Skilled physicalal therapy is not indicated at this time. Discharge Recommendations: SNF vs LTF  Further Equipment Recommendations for Discharge: N/A        SUBJECTIVE:   Non-verbal, but continued moaning during session.       OBJECTIVE DATA SUMMARY:       Past Medical History:   Diagnosis Date    Anemia NEC      Dementia      Hearing reduced      Hypertension      Shingles      Stroke [facility])       Past Surgical History:   Procedure Laterality Date    ABDOMEN SURGERY PROC UNLISTED        HX CHOLECYSTECTOMY        HX GYN        HX HYSTERECTOMY         Barriers to Learning/Limitations: yes; cognitive and physical  Compensate with: visual, verbal, tactile, kinesthetic cues/model  Prior Level of Function/Home Situation:   Home Situation  [facility] [address] [place]  One/Two Story Residence: One story  Living Alone: Yes  Support Systems: Home care staff  Patient Expects to be Discharged to[de-identified] Assisted living  Current DME Used/Available at Home: None  Critical Behavior:  Neurologic State: Alert;Confused  Orientation Level: Disoriented to place; Disoriented to situation;Disoriented to time;Oriented to person  Cognition: Decreased attention/concentration; Follows commands  Safety/Judgement: Decreased awareness of environment  Strength:    Strength: Grossly decreased, non-functional  Range Of Motion:  AROM: Grossly decreased, non-functional  PROM: Within functional limits  Functional Mobility:  Bed Mobility:  Rolling: Contact guard assistance; Additional time  Supine to Sit: Contact guard assistance;Minimum assistance; Additional time; Other (comment) (HOB elevated for assistance)  Sit to Supine: Moderate assistance; Additional time (HOB elevated for assistance)  Scooting: Maximum assistance; Additional time  Transfers:  Sit to Stand: Total assistance; Additional time  Stand to Sit: Total assistance; Additional time  Balance:   Sitting: Intact; Without support  Standing: Impaired; With support  Standing - Static: Constant support;Poor  Standing - Dynamic : None  Ambulation/Gait Training:  Gait Description (WDL):  (Not tested)  Activity Tolerance:   Fair  Please refer to the flowsheet for vital signs taken during this treatment.   After treatment:   [ ]         Patient left in no apparent distress sitting up in chair  [X]         Patient left in no apparent distress in bed  [X]         Call bell left within reach  [X]         Nursing notified  [ ]         Caregiver present  [ ]         Bed alarm activated      COMMUNICATION/EDUCATION:   [ ]         Fall prevention education was provided and the patient/caregiver indicated understanding. [ ]         Patient/family have participated as able in goal setting and plan of care. [ ]         Patient/family agree to work toward stated goals and plan of care. [X]         Patient understands intent and goals of therapy, but is neutral about his/her participation. [ ]         Patient is unable to participate in goal setting and plan of care.      Thank you for this referral.  Andressa Harris, PT   Time Calculation: 22 mins

## 2017-07-06 ENCOUNTER — APPOINTMENT (OUTPATIENT)
Dept: GENERAL RADIOLOGY | Age: 82
DRG: 178 | End: 2017-07-06
Attending: HOSPITALIST
Payer: MEDICARE

## 2017-07-06 LAB
ANION GAP BLD CALC-SCNC: 4 MMOL/L (ref 3–18)
BUN SERPL-MCNC: 20 MG/DL (ref 7–18)
BUN/CREAT SERPL: 16 (ref 12–20)
CALCIUM SERPL-MCNC: 8.2 MG/DL (ref 8.5–10.1)
CHLORIDE SERPL-SCNC: 108 MMOL/L (ref 100–108)
CO2 SERPL-SCNC: 31 MMOL/L (ref 21–32)
CREAT SERPL-MCNC: 1.23 MG/DL (ref 0.6–1.3)
ERYTHROCYTE [DISTWIDTH] IN BLOOD BY AUTOMATED COUNT: 14.4 % (ref 11.6–14.5)
GLUCOSE SERPL-MCNC: 87 MG/DL (ref 74–99)
HCT VFR BLD AUTO: 28.6 % (ref 35–45)
HGB BLD-MCNC: 9 G/DL (ref 12–16)
MCH RBC QN AUTO: 30.3 PG (ref 24–34)
MCHC RBC AUTO-ENTMCNC: 31.5 G/DL (ref 31–37)
MCV RBC AUTO: 96.3 FL (ref 74–97)
PLATELET # BLD AUTO: 254 K/UL (ref 135–420)
PMV BLD AUTO: 8.8 FL (ref 9.2–11.8)
POTASSIUM SERPL-SCNC: 4.3 MMOL/L (ref 3.5–5.5)
RBC # BLD AUTO: 2.97 M/UL (ref 4.2–5.3)
SODIUM SERPL-SCNC: 143 MMOL/L (ref 136–145)
WBC # BLD AUTO: 6.6 K/UL (ref 4.6–13.2)

## 2017-07-06 PROCEDURE — 92526 ORAL FUNCTION THERAPY: CPT

## 2017-07-06 PROCEDURE — 74011000255 HC RX REV CODE- 255: Performed by: HOSPITALIST

## 2017-07-06 PROCEDURE — 74011250637 HC RX REV CODE- 250/637: Performed by: HOSPITALIST

## 2017-07-06 PROCEDURE — 80048 BASIC METABOLIC PNL TOTAL CA: CPT | Performed by: HOSPITALIST

## 2017-07-06 PROCEDURE — 65270000029 HC RM PRIVATE

## 2017-07-06 PROCEDURE — 74011250636 HC RX REV CODE- 250/636: Performed by: HOSPITALIST

## 2017-07-06 PROCEDURE — 74011000258 HC RX REV CODE- 258: Performed by: HOSPITALIST

## 2017-07-06 PROCEDURE — 92611 MOTION FLUOROSCOPY/SWALLOW: CPT

## 2017-07-06 PROCEDURE — 97162 PT EVAL MOD COMPLEX 30 MIN: CPT

## 2017-07-06 PROCEDURE — 36415 COLL VENOUS BLD VENIPUNCTURE: CPT | Performed by: HOSPITALIST

## 2017-07-06 PROCEDURE — 74011000250 HC RX REV CODE- 250: Performed by: HOSPITALIST

## 2017-07-06 PROCEDURE — 74230 X-RAY XM SWLNG FUNCJ C+: CPT

## 2017-07-06 PROCEDURE — 85027 COMPLETE CBC AUTOMATED: CPT | Performed by: HOSPITALIST

## 2017-07-06 RX ADMIN — CLINDAMYCIN PHOSPHATE 300 MG: 150 INJECTION, SOLUTION, CONCENTRATE INTRAVENOUS at 23:51

## 2017-07-06 RX ADMIN — Medication 400 MG: at 08:14

## 2017-07-06 RX ADMIN — SPIRONOLACTONE 25 MG: 25 TABLET, FILM COATED ORAL at 08:14

## 2017-07-06 RX ADMIN — Medication 10 ML: at 14:00

## 2017-07-06 RX ADMIN — BARIUM SULFATE 10 ML: 400 SUSPENSION ORAL at 14:48

## 2017-07-06 RX ADMIN — HEPARIN SODIUM 5000 UNITS: 5000 INJECTION, SOLUTION INTRAVENOUS; SUBCUTANEOUS at 23:34

## 2017-07-06 RX ADMIN — BARIUM SULFATE 15 ML: 0.6 SUSPENSION ORAL at 14:47

## 2017-07-06 RX ADMIN — LEVOTHYROXINE SODIUM 100 MCG: 100 TABLET ORAL at 07:11

## 2017-07-06 RX ADMIN — Medication 10 ML: at 23:35

## 2017-07-06 RX ADMIN — CLINDAMYCIN PHOSPHATE 300 MG: 150 INJECTION, SOLUTION, CONCENTRATE INTRAVENOUS at 09:00

## 2017-07-06 RX ADMIN — MINERAL OIL, PETROLATUM: 425; 568 OINTMENT OPHTHALMIC at 17:50

## 2017-07-06 RX ADMIN — OXYBUTYNIN CHLORIDE 5 MG: 5 TABLET ORAL at 08:14

## 2017-07-06 RX ADMIN — Medication 250 MG: at 08:14

## 2017-07-06 RX ADMIN — ASPIRIN 81 MG 81 MG: 81 TABLET ORAL at 08:14

## 2017-07-06 RX ADMIN — OMEPRAZOLE 20 MG: 20 CAPSULE, DELAYED RELEASE ORAL at 08:14

## 2017-07-06 RX ADMIN — HEPARIN SODIUM 5000 UNITS: 5000 INJECTION, SOLUTION INTRAVENOUS; SUBCUTANEOUS at 08:21

## 2017-07-06 RX ADMIN — CLINDAMYCIN PHOSPHATE 300 MG: 150 INJECTION, SOLUTION, CONCENTRATE INTRAVENOUS at 03:49

## 2017-07-06 RX ADMIN — AMLODIPINE BESYLATE 10 MG: 5 TABLET ORAL at 08:56

## 2017-07-06 RX ADMIN — BARIUM SULFATE 5 ML: 400 PASTE ORAL at 14:48

## 2017-07-06 RX ADMIN — CLINDAMYCIN PHOSPHATE 300 MG: 150 INJECTION, SOLUTION, CONCENTRATE INTRAVENOUS at 15:21

## 2017-07-06 RX ADMIN — HEPARIN SODIUM 5000 UNITS: 5000 INJECTION, SOLUTION INTRAVENOUS; SUBCUTANEOUS at 15:20

## 2017-07-06 RX ADMIN — Medication 250 MG: at 23:35

## 2017-07-06 NOTE — PROGRESS NOTES
Shift Summary :  No signs of distress. Bed rest continued. Incontinent of urine. Oxygen continued with some darkness around mouth. Hazardous drug precautions and DDNR status  Continued. Mepilex dressing intact to sacral area. Nutrional and fluid intake poor.

## 2017-07-06 NOTE — PROGRESS NOTES
Swallowing Precautions posted by communication board. Education re: swallowing precautions provided to her granddaughter at bedside.      Thank you for this referral,   Kimberly Michael M.S.Silas., CCC/SLP

## 2017-07-06 NOTE — PROGRESS NOTES
Hospitalist Progress Note    Patient: Ashly Lowery MRN: [id number]  CSN: [id number]    YOB: 1924  Age: 80 y.o. Sex: female    DOA: 7/3/2017 LOS:  LOS: 3 days          Chief Complaint:    pneumonia      Assessment/Plan     Pneumonia, poss aspiration-on appropriate IV abx, no fevers, no resp distress, for MBS to eval for aspiration risks  Hx CVA  Dementia  Anemia, iron def  Weakness and failure to thrive  uncontrolled HTN-increased norvasc to 10 mg daily  Hypoxia-resolved, 02 support PRN  Hypokalemia-repleted    To go back to [facility] then transition to hospice there  Finalizing plans  Await MBS completion  Will d/c on low dose 02    No new issues identified    Patient Active Problem List   Diagnosis Code    Failure to thrive in adult R62.7    Hx pulmonary embolism Z80.36    HTN (hypertension) I10    Debility, unspecified R53.81    Unspecified constipation K59.00    Hypoxia R09.02    Aspiration pneumonia ([facility] [address]- RLL J69.0    Dementia F03.90       Subjective:  No nursing issues      Review of systems:    Pt confused, pleasant but not insightful      Vital signs/Intake and Output:  Visit Vitals    /54 (BP 1 Location: Left arm, BP Patient Position: At rest)    Pulse 69    Temp 98.3 °F (36.8 °C)    Resp 20    Ht 5' 4\" (1.626 m)    Wt 57.6 kg (126 lb 15.4 oz)    SpO2 98%    BMI 21.79 kg/m2     Current Shift:  07/06 0701 - 07/06 1900  In: 1906.3 [P.O.:50; I.V.:1856.3]  Out: -   Last three shifts:  07/04 1901 - 07/06 0700  In: 920 [P.O.:120;  I.V.:800]  Out: 0     Exam:    General: debilitated elderly confused WF, NAD  Head/Neck: NCAT, supple, No masses, No lymphadenopathy  CVS:Regular rate and rhythm, no M/R/G, S1/S2 heard, no thrill  Lungs:Clear to auscultation bilaterally, no wheezes, rhonchi, or rales  Abdomen: Soft, Nontender, No distention, Normal Bowel sounds, No hepatomegaly  Extremities: No C/C/E, pulses palpable 2+  Skin:normal texture and turgor, no rashes, no lesions  Neuro:chronic right side weakness  Psych:appropriate                Labs: Results:       Chemistry Recent Labs      07/06/17 0458 07/05/17   0559  07/04/17   0500  07/03/17   1430   GLU  87  86  80  95   NA  143  145  147*  144   K  4.3  3.2*  3.0*  3.1*   CL  108  108  109*  106   CO2  31  31  33*  33*   BUN  20*  25*  30*  37*   CREA  1.23  1.37*  1.40*  1.59*   CA  8.2*  7.8*  7.8*  8.0*   AGAP  4  6  5  5   BUCR  16  18  21*  23*   AP   --    --    --   205*   TP   --    --    --   6.6   ALB   --    --    --   2.4*   GLOB   --    --    --   4.2*   AGRAT   --    --    --   0.6*      CBC w/Diff Recent Labs      07/06/17 0458 07/05/17   0559  07/04/17   0500  07/03/17   1430   WBC  6.6  5.7  5.8  7.2   RBC  2.97*  2.94*  2.89*  3.22*   HGB  9.0*  8.8*  8.8*  9.9*   HCT  28.6*  28.8*  28.1*  31.0*   PLT  254  255  249  300   GRANS   --    --    --   65   LYMPH   --    --    --   22   EOS   --    --    --   6*      Cardiac Enzymes Recent Labs      07/03/17   1430   CPK  52   CKND1  4.6*      Coagulation No results for input(s): PTP, INR, APTT in the last 72 hours. No lab exists for component: INREXT    Lipid Panel No results found for: CHOL, CHOLPOCT, CHOLX, CHLST, CHOLV, 086878, HDL, LDL, LDLC, DLDLP, 248672, VLDLC, VLDL, TGLX, TRIGL, TRIGP, TGLPOCT, CHHD, CHHDX   BNP No results for input(s): BNPP in the last 72 hours.    Liver Enzymes Recent Labs      07/03/17   1430   TP  6.6   ALB  2.4*   AP  205*   SGOT  29      Thyroid Studies Lab Results   Component Value Date/Time    TSH 1.54 06/22/2012 12:50 AM        Procedures/imaging: see electronic medical records for all procedures/Xrays and details which were not copied into this note but were reviewed prior to creation of Alok Myers MD

## 2017-07-06 NOTE — ROUTINE PROCESS
Bedside and Verbal shift change report given to ROSALINE Gamboa RN  (oncoming nurse) by CHERELLE Echols Rn  (offgoing nurse). Report included the following information SBAR, Kardex, Recent Results and Med Rec Status.

## 2017-07-06 NOTE — PROGRESS NOTES
Problem: Dysphagia (Adult)  Goal: *Acute Goals and Plan of Care (Insert Text)    Recommendations:  Diet: mechanical soft solids, honey thick liquids  Meds: per patient preference  Aspiration Precautions  Oral Care TID      Goals: Patient will:  1. Tolerate PO trials with 0 s/s overt distress in 4/5 trials  2. Utilize compensatory swallow strategies/maneuvers (decrease bite/sip, size/rate, alt. liq/sol) with min cues in 4/5 trials  3. Perform oral-motor/laryngeal exercises to increase oropharyngeal swallow function with min cues  4. Complete an objective swallow study (i.e., MBSS) to assess swallow integrity, r/o aspiration, and determine of safest LRD, min A     Outcome: Progressing Towards Goal  SPEECH LANGUAGE PATHOLOGY DYSPHAGIA TREATMENT     Patient: Genia Ruelas (31 y.o. female)  Date: 7/6/2017  Diagnosis: Hypoxia Aspiration pneumonia (HCC)       Precautions: Aspiration. ASSESSMENT:  Mild/moderate oral phase and moderate pharyngeal phase dysphagia. Patient pleasantly confused and responsive at bedside with fair command following. Unable to verbalize name. Adjusted HOB 45*; tolerated well with habitual chin tuck posture with poor upright posture. x1 delayed throat clear with thin liquids; resolved with thickened liquids. Nectar thick and honey thick tolerated without event; continue to assess tolerance for nectar. MBS on this date to determine least restrictive diet. Education re: dysphagia and thickened liquids provided. No evidence of learning. D/w ANALISA Huang. Progression toward goals:  [ ]         Improving appropriately and progressing toward goals  [X]         Improving slowly and progressing toward goals  [ ]         Not making progress toward goals and plan of care will be adjusted       PLAN:  Recommendations and Planned Interventions:  [facility] Soft with HTL. SLP to f/u for dysphagia management. Patient continues to benefit from skilled intervention to address the above impairments.  Continue treatment per established plan of care. [facility]charge Recommendations:  [facility]       SUBJECTIVE:   Patient stated Oh. 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 Trials:              Patient Position: 39 HOB              Vocal quality prior to P.O.: Low volume              Consistency Presented: Thin liquid, Nectar thick liquid, Honey thick liquid              How Presented: Self-fed/presented, Cup/sip              How Much:  (x1 thin; x5 NTL; x5 HTL)              Bolus Acceptance: No impairment              Bolus Formation/Control: Impaired              Type of Impairment: Delayed, Premature spillage              Propulsion: Delayed (# of seconds)              Oral Residue: None              Initiation of Swallow: Delayed (# of seconds)              Laryngeal Elevation: Functional              Aspiration Signs/Symptoms: Change vocal quality, Clear throat              Pharyngeal Phase Characteristics: Altered vocal quality, Suspected pharyngeal residue              Effective Modifications: Cup/sip, Small sips and bites              Cues for Modifications: Minimal              Comments: able to self-feed when set up.                   Oral Phase Severity: Mild-moderate              Pharyngeal Phase Severity : Moderate              PAIN:  Start of Tx: 0  End of Tx: 0      After treatment:   [ ]              Patient left in no apparent distress sitting up in chair  [X]              Patient left in no apparent distress in bed  [X]              Call bell left within reach  [X]              Nursing notified  [ ]              Family present  [ ]              Caregiver present  [ ]              Bed alarm activated         COMMUNICATION/EDUCATION:   [X]        Aspiration precautions; swallow safety; compensatory techniques  [X]        Patient unable to participate in education; education ongoing with staff  [X]        Posted safety precautions in patient's room.   [ ]         Oral-motor/laryngeal strengthening exercises        JESSICA Solo  Time Calculation: 10 mins

## 2017-07-06 NOTE — PROGRESS NOTES
Shift summary: pt pleasant and cooperative with care. Pt denies pain. Pt needs assistance with feeding/encouragment. Pt tolerated taking medications whole with HTL. Pt changed and repositioned multiple times this shift. Pt was noted to be hallucinating this afternoon, Dr. Sven Goncalves aware.  Pt is now a DDNR

## 2017-07-06 NOTE — PROGRESS NOTES
Shift summary: Received patient in bed, alert and oriented x1 to self and confused throughout the shift. Patient denies any pain. Patient is courteous, pleasant. Patient needs encouragement with feeding. Patient was taken to X-ray at [phone] for a barium study, no finding of aspiration or laryngeal penetration found, within normal limits. PT eval done today, no new orders at this time.

## 2017-07-06 NOTE — PROGRESS NOTES
Occupational Therapy Eval/Screening:  Services are not indicated at this time. Occupational Therapy Eval consult received. Pt with advanced dementia and requiring total assist from [facility] staff for all ADLs including self-feeding. No functional decline at this time, therefore no skilled Occupational Therapy indicated. In agreement that Hospice referral appropriate. Thank you.   Glory Rodriguez, OTR/L

## 2017-07-06 NOTE — ROUTINE PROCESS
Bedside shift change report given to Lin Murray Rn (oncoming nurse) by Kaylin Birch RN   (offgoing nurse). Report included the following information SBAR, Kardex, Intake/Output, MAR and Recent Results.

## 2017-07-06 NOTE — PROGRESS NOTES
Problem: Mobility Impaired (Adult and Pediatric)  Goal: *Acute Goals and Plan of Care (Insert Text)  Physical Therapy Goals  Initiated 7/6/2017 and to be accomplished within 3 day(s)  1. Patient will move from supine <> sit with Min A in prep for out of bed activity and change of position. 2. Patient will perform sit<> stand with Max A with rolling walker in prep for transfers/ambulation. 3. Patient will perform lateral transfer from bed <> chair with Max A with transfer board for time up in chair for completion of ADL activity. 4. Patient will be able to perform therex activity w/ Min A and Min VCing. Outcome: Progressing Towards Goal  PHYSICAL THERAPY EVALUATION     Patient: Hao Lacy (33 y.o. female)  Date: 7/6/2017  Primary Diagnosis: Hypoxia        Precautions: Fall         ASSESSMENT :  Based on the objective data described below, the patient presents with decrease independence in bed mobility and transfer activity. Pt was seen with grand daughter present in the room. Pt is very confused, oriented only to self, and requires constant re-orienting during session in order to follow commands. During assessment pt can follow commands initially but then requires Max TCing and Maggie Acevedo in order for pt to assist w/ completion of task. Pt has good sitting/dynamic balance, but requires TCing initially in order to ensure stability. PT attempted to assess pt lateral scooting to [facility] but pt could not c/o task secondary to possible weakness in B/L UE. PT did not attempt to stand pt secondary to pt reporting decrease activity tolerance and was ready to go back to bed. Pt was transferred back to bed after treatment session, all bed rails up, call bell and tray in reach, granddaughter present in the room, nurse notified after session.  As per note from yesterday, PT spoke w/ daughter and has also spoke w/ granddaughter today and both told PT that she has been w/c bound and requires TA w/ dressing, feeding, bathing, and transfers. Pt at this time will be put on a 3 day trial to assess whether pt can increase function upon d/c from hospital.      Patient will benefit from skilled intervention to address the above impairments. Patients rehabilitation potential is considered to be Guarded  Factors which may influence rehabilitation potential include:   [ ]         None noted  [X]         Mental ability/status  [X]         Medical condition  [X]         Home/family situation and support systems  [X]         Safety awareness  [ ]         Pain tolerance/management  [ ]         Other:        PLAN :  Recommendations and Planned Interventions:  [X]           Bed Mobility Training             [X]    Neuromuscular Re-Education  [X]           Transfer Training                   [ ]    Orthotic/Prosthetic Training  [ ]           Gait Training                          [ ]    Modalities  [X]           Therapeutic Exercises          [ ]    Edema Management/Control  [X]           Therapeutic Activities            [ ]    Patient and Family Training/Education  [ ]           Other (comment):     Frequency/Duration: Patient will be followed by physical therapy daily to address goals. Discharge Recommendations: [facility] vs Long Term Care  Further Equipment Recommendations for Discharge: TBD by Rehab       SUBJECTIVE:   Patient stated Non-verbal, but moans during session.       OBJECTIVE DATA SUMMARY:       Past Medical History:   Diagnosis Date    Anemia NEC      Dementia      Hearing reduced      Hypertension      Shingles      Stroke [facility])       Past Surgical History:   Procedure Laterality Date    ABDOMEN SURGERY PROC UNLISTED        HX CHOLECYSTECTOMY        HX GYN        HX HYSTERECTOMY         Barriers to Learning/Limitations: yes;  cognitive, physical and altered mental status (i.e.Sedation, Confusion)  Compensate with: Verbal Cues and Tactile Cues  Prior Level of Function/Home Situation:   [address] [facility] Environment: [address] [place] [address] Road Name: [address]  One/Two Story Residence: One story  Living Alone: Yes  Support Systems: Home care staff  Patient Expects to be Discharged to[de-identified] Assisted living  Current DME Used/Available at Home: None  Critical Behavior:  Neurologic State: Confused  Orientation Level: Disoriented to place; Disoriented to situation;Disoriented to time;Oriented to person  Cognition: Memory loss;Decreased command following;Decreased attention/concentration;Poor safety awareness  Safety/Judgement: Decreased awareness of environment;Decreased insight into deficits  Psychosocial  Patient Behaviors: Altered mental status  Purposeful Interaction: Yes  Pt Identified Daily Priority: Other (comment)  Caritas Process: Nurture loving kindness;Establish trust;Create healing environment  Caring Interventions: Therapeutic modalities  Reassure: Therapeutic listening; Acceptance;Quiet presence  Therapeutic Modalities: Deep breathing  Skin Condition/Temp: Warm  Skin Integrity: Wound (add Wound LDA)  Skin Integumentary  Skin Color: Appropriate for ethnicity  Skin Condition/Temp: Warm  Skin Integrity: Wound (add Wound LDA)  Turgor: Epidermis thin w/ loss of subcut tissue  Hair Growth: Present  Varicosities: Absent  Strength:    Strength: Grossly decreased, non-functional  Range Of Motion:  AROM: Grossly decreased, non-functional  Functional Mobility:  Bed Mobility:  Supine to Sit: Moderate assistance; Additional time; Other (comment) (HOB elevated for assistance)  Sit to Supine: Maximum assistance; Additional time; Other (comment) (HOB elevated for assistance)  Scooting: Total assistance; Additional time;Assist x2  Transfers:  Sit to Stand:  (Not tested)  Stand to Sit:  (Not tested)  Balance:   Sitting: Intact  Standing:  (Not Tested)  Ambulation/Gait Training:  Gait Description (WDL):  (Not Tested)  Therapeutic Exercises:   Dynamic Balance activity  Pain:  Pain Scale 1: Numeric (0 - 10)  Pain Intensity 1: 0  Activity Tolerance:   Poor  Please refer to the flowsheet for vital signs taken during this treatment. After treatment:   [ ]         Patient left in no apparent distress sitting up in chair  [X]         Patient left in no apparent distress in bed  [X]         Call bell left within reach  [X]         Nursing notified  [X]         Caregiver present (Granddaughter)  [ ]         Bed alarm activated      COMMUNICATION/EDUCATION:   [X]         Fall prevention education was provided and the patient/caregiver indicated understanding. [X]         Patient/family have participated as able in goal setting and plan of care. [X]         Patient/family agree to work toward stated goals and plan of care. [ ]         Patient understands intent and goals of therapy, but is neutral about his/her participation. [ ]         Patient is unable to participate in goal setting and plan of care.      Thank you for this referral.  Anh Guevara, PT   Time Calculation: 15 mins

## 2017-07-06 NOTE — PROGRESS NOTES
Problem: Dysphagia (Adult)  Goal: *Acute Goals and Plan of Care (Insert Text)    Recommendations:  Diet: mechanical soft solids, nectar thick liquids  Meds: per patient preference  Aspiration Precautions  Oral Care TID      Goals: Patient will:  1. Tolerate PO trials with 0 s/s overt distress in 4/5 trials  2. Utilize compensatory swallow strategies/maneuvers (decrease bite/sip, size/rate, alt. liq/sol) with min cues in 4/5 trials  3. Perform oral-motor/laryngeal exercises to increase oropharyngeal swallow function with min cues  4. Complete an objective swallow study (i.e., MBSS) to assess swallow integrity, r/o aspiration, and determine of safest LRD, min A     Outcome: Progressing Towards Goal  SPEECH PATHOLOGY MODIFIED BARIUM SWALLOW STUDY     Patient: Fern Mckeon (70 y.o. female)  Date: 7/6/2017  Primary Diagnosis: Hypoxia        Precautions: Aspiration. ASSESSMENT :  MBS completed. 0 aspiration/penetration visualized. Increased confusion, poor command following, and resistive to interventions this afternoon versus this morning's dysphagia therapy. Poor postural control in the chair and at bedside resulting in habitual chin tuck when adjusted upright in the chair (c/o back pain). Deglutition of liquids judged to be within functional limits as patient presented with timely swallow without pharyngeal residue across 1-2 small sip trials of thin, nectar thick, and honey thick; however patient presents differently at bedside with a throat clear with thin liquids. Patient refused solid texture trials despite maximum encouragement. Pt presents with mild/moderate oral phase and moderate pharyngeal phase dysphagia, as evidenced above, which places pt at risk for aspiration. At this time, safest for mechanical soft solid, nectar thick liquid diet. SLP utilized video of study for visual feedback, education, and recommendations for pt/caregiver; verbalized comprehension.       Patient will benefit from skilled intervention to address the above impairments. Patients rehabilitation potential is considered to be Guarded  Factors which may influence rehabilitation potential include:   [X]              Mental ability/status  [X]              Medical condition  [X]              Pain tolerance/management       PLAN :  Recommendations and Planned Interventions:  Mechanical Soft with Nectar Thick Liquids. SLP to f/u for 2-3X for diet tolerance/advacement. Frequency/Duration: Patient will be followed by speech-language pathology 2-3X to address goals. Discharge Recommendations: Hospice       SUBJECTIVE:   Patient stated No, I don't want to. OBJECTIVE:       Past Medical History:   Diagnosis Date    Anemia NEC      Dementia      Hearing reduced      Hypertension      Shingles      Stroke [facility])       Past Surgical History:   Procedure Laterality Date    ABDOMEN SURGERY PROC UNLISTED        HX CHOLECYSTECTOMY        HX GYN        HX HYSTERECTOMY         Prior Level of Function/Home Situation: per pt/chart  Home Situation  Home Environment: Assisted living  [address] [facility] [address] Name: [facility]  One/Two Story Residence: One story  Living Alone: Yes  Support Systems: Home care staff  Patient Expects to be Discharged to[de-identified] Assisted living  Current DME Used/Available at Home: None  Diet prior to admission: regular, thin   Current Diet:  MS; NTL   Radiologist: HRRA  Film Views: Fluoro;Lateral  Patient Position: 90 in chair      Trial 1: Trial 2:   Consistency Presented:  Thin liquid Consistency Presented: Nectar thick liquid   How Presented: SLP-fed/presented;Straw How Presented: SLP-fed/presented;Straw   Consistency Amount: 1 Consistency Amount: 1   Bolus Acceptance: Other (comment) (maxA for PO intake) Bolus Acceptance: Other (comment)   Bolus Formation/Control: No impairment:   Bolus Formation/Control:  (maxA for PO intake):     Propulsion: No impairment Propulsion: No impairment   Oral Residue: None Oral Residue: None Initiation of Swallow: No impairment;Triggered at vallecula Initiation of Swallow: Triggered at valleculae   Timing: No impairment;Pooling 1-5 sec Timing: Pooling 1-5 sec   Penetration: None Penetration: None   Aspiration/Timing: No evidence of aspiration Aspiration/Timing: No evidence of aspiration   Pharyngeal Clearance: No residue Pharyngeal Clearance: No residue   Attempted Modifications: Straw;Small sips and bites; Chin tuck Attempted Modifications: Straw;Small sips and bites; Chin tuck   Effective Modifications: Straw;Small sips and bites; Chin tuck Effective Modifications: None   Cues for Modifications: Minimal       Comments: Patient refused solid PO trials. Trial 3: Trial 4:   Consistency Presented: Honey thick liquid     How Presented: SLP-fed/presented;Straw     Consistency Amount: 2     Bolus Acceptance: Other (comment) (maxA for PO intake)     Bolus Formation/Control: No impairment, issues, or problems :    :     Propulsion: No impairment     Oral Residue: None     Initiation of Swallow: Triggered at valleculae     Timing: Pooling 1-5 sec     Penetration: None     Aspiration/Timing: No evidence of aspiration     Pharyngeal Clearance: No residue     Attempted Modifications: Straw;Small sips and bites; Chin tuck     Effective Modifications: None     Cues for Modifications: None              Decreased Tongue Base Retraction?: Yes  Laryngeal Elevation: WFL (within functional limits)  Aspiration/Penetration Score: 1 (No penetration or aspiration-Contrast does not enter the airway)  Pharyngeal Symmetry: Not assessed  Pharyngeal-Esophageal Segment: No impairment  Pharyngeal Dysfunction: Decreased strength;Decreased tongue base retraction     Oral Phase Severity: Mild-moderate  Pharyngeal Phase Severity: Moderate     GCODESwallowing:  Swallow Current Status CK= 40-59%   Swallow Goal Status CI= 1-19%     The severity rating is based on the following outcomes:  ABBEY Noms Swallow Level 4 8-point Penetration-Aspiration Scale: Score 1  Clinical Judgement     PAIN:  Start of Study: 0  End of Study: 0  *facial grimace and c/o back pain; did not rate pain when asked       COMMUNICATION/EDUCATION:   [X]  Aspiration risks/precautions; compensatory swallow techniques  [ ]  Patient/family have participated as able in goal setting and plan of care. [ ]  Patient/family agree to work toward stated goals and plan of care. [ ]  Patient understands intent and goals of therapy, but is neutral about his/her participation. [X]  Patient is unable to participate in goal setting and plan of care.      Thank you for this referral.  JESSICA Vargas  Time Calculation: 35 mins

## 2017-07-06 NOTE — PROGRESS NOTES
MBS results available. Recommend: Mechanical Soft with Nectar Thick Liquids    Other:  Straws permitted  Encouragement for PO   Upright position 45-60 HOB.  Or 90 in chair  Chin tuck   Small bites/sips

## 2017-07-06 NOTE — PROGRESS NOTES
Palliative Medicine Progress Note  [facility]: [phone] ([phone])  [facility]: [phone]   [facility]: [phone]    Patient Name: Daniel Ramos  YOB: 1924    Date of Initial Consult: 7/5/17  Reason for Consult: care decisions  Requesting Provider: Dr. Sosa [facility]   Primary Care Physician: Leah Wilson MD      SUMMARY:   Daniel Ramos is a 80y.o. year old with a past history of advanced dementia, recurrent UTIs,  CVA, HTN, who was admitted on 7/3/2017 from [address] [place] [address] w RLL pneumonia of aspiration origin. Very confused initially with acute on chronic renal failure. Has been at [facility] about three years, lived alone before that after 's death in 2002. No longer walking or consistently feeding herself. Dependent in toileting and bathing. She has two daughter, both local. Guillermo Doyle is HCPOA. Patient is feeling much better w/ hydration, denies complaints, would like \"some popcorn\". SLP swallowing assessment pending. Oriented to person only. Has an AMD that daughter will bring in.    7/6/17: Awake and alert. Confused overnight. PALLIATIVE DIAGNOSES:     Patient Active Problem List   Diagnosis Code    Failure to thrive in adult R62.7    Hx pulmonary embolism Z86.711    HTN (hypertension) I10    Debility, unspecified R53.81    Unspecified constipation K59.00    Hypoxia R09.02    Aspiration pneumonia ([facility] [address]- RLL J69.0    Dementia F03.90     1. PLAN:   1. Met with daughter Oscar Gilbert and described clinical course and expected complication of disease and stage of her dementia. She relates her mother's clinical decline in last several months. She had always been very independent and the daughter is saddened by the change she has seen. She believes her mother would not want aggressive therapies to prolong her life if she is cannot get better. She would not wish intubation, tube feeding or repeated hospitalizations.  She has led a \"good life\" and now would wish to focus on comfort. They had already been pursuing an ICF medicaid placement and would like to know about a comfort care plan administered by Hospice if she is placed in LTC. Reviewed recommendations regarding artificial hydration/nutrition in advanced dementia. After discussing dementia and role of CPR, she is convinced her mother would not want this. DDNR executed. She would like to complete course of antibiotic and discharge to ICF w/ hospice. Questions answered. Hospice consult placed. 2. Consider stopping oxybutinin- Beer's list med and patient is already incontinent, unlikely to be providing any clinical benefit. 7/6/17: Improving. DIet started. Hospice referral placed. Family wish to pursue ICF with hospice rather than SNF admission. 3. Initial consult note routed to primary continuity provider  4.  Communicated plan of care with: Palliative IDT       GOALS OF CARE:   Limited curative with hospice d/c    Advance Care Planning 7/5/2017   Patient's Healthcare Decision Maker is: Legal Next of Malik 69   Primary Decision Maker Name Scott Torre   Primary Decision Maker Phone Number [phone]   Primary Decision Maker Relationship to Patient Adult child   Confirm Advance Directive Yes, not on file   Does the patient have other document types Do Not Resuscitate           HISTORY:     History obtained from: chart, family    CHIEF COMPLAINT: see summary    HPI/SUBJECTIVE:    The patient is:   [] Verbal and participatory  [x] Non-participatory due to:   dementia     Clinical Pain Assessment (nonverbal scale for nonverbal patients): Pain: 0         FUNCTIONAL ASSESSMENT:     Palliative Performance Scale (PPS):  PPS: 30       PSYCHOSOCIAL/SPIRITUAL SCREENING:     Advance Care Planning:  Advance Care Planning 7/5/2017   Patient's Healthcare Decision Maker is: Legal Next of Malik La   Primary Decision Maker Name Zuleyka Strong   Primary Decision Maker Phone Number [phone]   Primary Decision Maker Relationship to Patient Adult child   Confirm Advance Directive Yes, not on file   Does the patient have other document types Do Not Resuscitate        Any spiritual / Rastafari concerns:  [] Yes /  [x] No    Caregiver Burnout:  [] Yes /  [x] No /  [] No Caregiver Present      Anticipatory grief assessment:   [x] Normal  / [] Maladaptive       ESAS Anxiety: Anxiety: 0    ESAS Depression:          REVIEW OF SYSTEMS:     Positive and pertinent negative findings in ROS are noted above in HPI. The following systems were [x] reviewed / [] unable to be reviewed as noted in HPI  Other findings are noted below. Systems: constitutional, ears/nose/mouth/throat, respiratory, gastrointestinal, genitourinary, musculoskeletal, integumentary, neurologic, psychiatric, endocrine. Positive findings noted below. Modified ESAS Completed by: provider           Pain: 0   Anxiety: 0 Nausea: 0   Anorexia: 0 Dyspnea: 0     Constipation: Yes     Stool Occurrence(s): 0        PHYSICAL EXAM:     Wt Readings from Last 3 Encounters:   07/06/17 57.6 kg (126 lb 15.4 oz)   02/06/17 57.2 kg (126 lb)   04/22/15 61.7 kg (136 lb)     Blood pressure 182/57, pulse 68, temperature 98.3 °F (36.8 °C), resp. rate 20, height 5' 4\" (1.626 m), weight 57.6 kg (126 lb 15.4 oz), SpO2 96 %.   Pain:  Pain Scale 1: Visual  Pain Intensity 1: 0                 Last bowel movement:     Constitutional: elderly, NAD  Eyes: pupils equal, anicteric  ENMT: no nasal discharge, moist mucous membranes  Cardiovascular: regular rhythm, distal pulses intact  Respiratory: breathing not labored, symmetric  Gastrointestinal: soft non-tender, +bowel sounds  Musculoskeletal: no deformity, no tenderness to palpation  Skin: warm, dry  Neurologic: following simple commands, moving all extremities  Psychiatric: pleasantly confused  Other:       HISTORY:     Principal Problem:    Aspiration pneumonia ([address]- RLL (7/3/2017)    Active Problems:    Failure to thrive in adult (7/1/2012)      HTN (hypertension) (7/1/2012)      Debility, unspecified (7/3/2012)      Hypoxia (7/3/2017)      Dementia ()      Past Medical History:   Diagnosis Date    Anemia NEC     Dementia     Hearing reduced     Hypertension     Shingles     Stroke [facility])       Past Surgical History:   Procedure Laterality Date    ABDOMEN SURGERY PROC UNLISTED      HX CHOLECYSTECTOMY      HX GYN      HX HYSTERECTOMY        History reviewed. No pertinent family history. History reviewed, no pertinent family history.   Social History   Substance Use Topics    Smoking status: Former Smoker    Smokeless tobacco: Never Used    Alcohol use No     Allergies   Allergen Reactions    Penicillins Hives      Current Facility-Administered Medications   Medication Dose Route Frequency    amLODIPine (NORVASC) tablet 10 mg  10 mg Oral DAILY    aspirin chewable tablet 81 mg  81 mg Oral DAILY    white petrolatum-mineral oil (LACRILUBE S.O.P.) ointment   Ophthalmic QPM    levothyroxine (SYNTHROID) tablet 100 mcg  100 mcg Oral ACB    magnesium oxide (MAG-OX) tablet 400 mg  400 mg Oral DAILY    omeprazole (PRILOSEC) capsule 20 mg  20 mg Oral DAILY    oxybutynin (DITROPAN) tablet 5 mg  5 mg Oral TID    spironolactone (ALDACTONE) tablet 25 mg  25 mg Oral DAILY    sodium chloride (NS) flush 5-10 mL  5-10 mL IntraVENous Q8H    sodium chloride (NS) flush 5-10 mL  5-10 mL IntraVENous PRN    levoFLOXacin (LEVAQUIN) 750 mg in D5W IVPB  750 mg IntraVENous Q48H    heparin (porcine) injection 5,000 Units  5,000 Units SubCUTAneous Q8H    Saccharomyces boulardii (FLORASTOR) capsule 250 mg  250 mg Oral BID    clindamycin phosphate (CLEOCIN) 300 mg in 0.9% sodium chloride (MBP/ADV) 100 mL ADV  300 mg IntraVENous Q6H        LAB AND IMAGING FINDINGS: Lab Results   Component Value Date/Time    WBC 6.6 07/06/2017 04:58 AM    HGB 9.0 07/06/2017 04:58 AM    PLATELET 091 36/29/8478 04:58 AM     Lab Results   Component Value Date/Time    Sodium 143 07/06/2017 04:58 AM    Potassium 4.3 07/06/2017 04:58 AM    Chloride 108 07/06/2017 04:58 AM    CO2 31 07/06/2017 04:58 AM    BUN 20 07/06/2017 04:58 AM    Creatinine 1.23 07/06/2017 04:58 AM    Calcium 8.2 07/06/2017 04:58 AM    Magnesium 2.2 04/10/2010 04:30 AM      Lab Results   Component Value Date/Time    AST (SGOT) 29 07/03/2017 02:30 PM    Alk.  phosphatase 205 07/03/2017 02:30 PM    Protein, total 6.6 07/03/2017 02:30 PM    Albumin 2.4 07/03/2017 02:30 PM    Globulin 4.2 07/03/2017 02:30 PM     Lab Results   Component Value Date/Time    INR 1.0 08/12/2016 04:25 PM    Prothrombin time 12.5 08/12/2016 04:25 PM    aPTT 28.9 08/12/2016 04:25 PM      Lab Results   Component Value Date/Time    Iron 34 07/05/2017 10:49 AM    TIBC 114 07/05/2017 10:49 AM    Iron % saturation 30 07/05/2017 10:49 AM      No results found for: PH, PCO2, PO2  No components found for: [place]   Lab Results   Component Value Date/Time    CK 52 07/03/2017 02:30 PM    CK - MB 2.4 07/03/2017 02:30 PM              Total time: 25 raj  Counseling / coordination time, spent as noted above: 20  > 50% counseling / coordination        Jose Antonio Peters MD  Palliative Medicine

## 2017-07-07 PROCEDURE — 97110 THERAPEUTIC EXERCISES: CPT

## 2017-07-07 PROCEDURE — 65270000029 HC RM PRIVATE

## 2017-07-07 PROCEDURE — 74011250636 HC RX REV CODE- 250/636: Performed by: HOSPITALIST

## 2017-07-07 PROCEDURE — 74011000250 HC RX REV CODE- 250: Performed by: HOSPITALIST

## 2017-07-07 PROCEDURE — 74011250637 HC RX REV CODE- 250/637: Performed by: HOSPITALIST

## 2017-07-07 PROCEDURE — 77010033678 HC OXYGEN DAILY

## 2017-07-07 PROCEDURE — 74011000258 HC RX REV CODE- 258: Performed by: HOSPITALIST

## 2017-07-07 RX ORDER — AMLODIPINE BESYLATE 10 MG/1
10 TABLET ORAL DAILY
Qty: 30 TAB | Refills: 0 | Status: SHIPPED | OUTPATIENT
Start: 2017-07-07

## 2017-07-07 RX ORDER — LEVOTHYROXINE SODIUM 100 UG/1
100 TABLET ORAL
Qty: 30 TAB | Refills: 0 | Status: SHIPPED | OUTPATIENT
Start: 2017-07-07

## 2017-07-07 RX ORDER — CLINDAMYCIN HYDROCHLORIDE 300 MG/1
300 CAPSULE ORAL 3 TIMES DAILY
Qty: 15 CAP | Refills: 0 | Status: SHIPPED | OUTPATIENT
Start: 2017-07-07 | End: 2017-07-12

## 2017-07-07 RX ORDER — LEVOFLOXACIN 750 MG/1
750 TABLET ORAL
Qty: 2 TAB | Refills: 0 | Status: SHIPPED | OUTPATIENT
Start: 2017-07-07 | End: 2017-07-12

## 2017-07-07 RX ADMIN — Medication 250 MG: at 09:46

## 2017-07-07 RX ADMIN — MINERAL OIL, PETROLATUM: 425; 568 OINTMENT OPHTHALMIC at 18:00

## 2017-07-07 RX ADMIN — ASPIRIN 81 MG 81 MG: 81 TABLET ORAL at 09:46

## 2017-07-07 RX ADMIN — CLINDAMYCIN PHOSPHATE 300 MG: 150 INJECTION, SOLUTION, CONCENTRATE INTRAVENOUS at 09:57

## 2017-07-07 RX ADMIN — AMLODIPINE BESYLATE 10 MG: 5 TABLET ORAL at 09:46

## 2017-07-07 RX ADMIN — HEPARIN SODIUM 5000 UNITS: 5000 INJECTION, SOLUTION INTRAVENOUS; SUBCUTANEOUS at 23:15

## 2017-07-07 RX ADMIN — Medication 250 MG: at 22:24

## 2017-07-07 RX ADMIN — Medication 400 MG: at 09:46

## 2017-07-07 RX ADMIN — HEPARIN SODIUM 5000 UNITS: 5000 INJECTION, SOLUTION INTRAVENOUS; SUBCUTANEOUS at 09:43

## 2017-07-07 RX ADMIN — CLINDAMYCIN PHOSPHATE 300 MG: 150 INJECTION, SOLUTION, CONCENTRATE INTRAVENOUS at 16:04

## 2017-07-07 RX ADMIN — CLINDAMYCIN PHOSPHATE 300 MG: 150 INJECTION, SOLUTION, CONCENTRATE INTRAVENOUS at 05:00

## 2017-07-07 RX ADMIN — SPIRONOLACTONE 25 MG: 25 TABLET, FILM COATED ORAL at 09:46

## 2017-07-07 RX ADMIN — HEPARIN SODIUM 5000 UNITS: 5000 INJECTION, SOLUTION INTRAVENOUS; SUBCUTANEOUS at 16:03

## 2017-07-07 RX ADMIN — Medication 10 ML: at 05:02

## 2017-07-07 RX ADMIN — Medication 10 ML: at 14:00

## 2017-07-07 RX ADMIN — LEVOFLOXACIN 750 MG: 5 INJECTION, SOLUTION INTRAVENOUS at 16:03

## 2017-07-07 RX ADMIN — LEVOTHYROXINE SODIUM 100 MCG: 100 TABLET ORAL at 07:51

## 2017-07-07 RX ADMIN — OMEPRAZOLE 20 MG: 20 CAPSULE, DELAYED RELEASE ORAL at 09:46

## 2017-07-07 NOTE — ROUTINE PROCESS
Bedside and Verbal shift change report given to ROSALINE Gamboa RN (oncoming nurse) by Christiano Frias (offgoing nurse). Report included the following information SBAR, Kardex, Intake/Output and MAR.

## 2017-07-07 NOTE — DISCHARGE SUMMARY
Discharge Summary    Patient: Abdelrahman Long MRN: [id number]  CSN: [id number]    YOB: 1924  Age: 80 y.o. Sex: female    DOA: 7/3/2017 LOS:  LOS: 4 days   Discharge Date:      Primary Care Provider:  Christiano Combs MD    Admission Diagnoses: Hypoxia    Discharge Diagnoses:    Problem List as of 7/7/2017  Date Reviewed: 7/1/2012          Codes Class Noted - Resolved    RESOLVED: ARF (acute renal failure) ([facility] ICD-10-CM: N17.9  ICD-9-CM: 584.9  6/25/2012 - 7/3/2012        RESOLVED: Other pulmonary embolism and infarction ICD-10-CM: I26.99  ICD-9-CM: 415.19  6/21/2012 - 7/3/2012        RESOLVED: HTN (hypertension) (Chronic) ICD-10-CM: I10  ICD-9-CM: 401.9  6/25/2012 - 7/3/2012        RESOLVED: UTI (lower urinary tract infection) ICD-10-CM: N39.0  ICD-9-CM: 599.0  6/21/2012 - 7/3/2012        RESOLVED: Troponin level elevated ICD-10-CM: R74.8  ICD-9-CM: 790.6  6/21/2012 - 7/3/2012    Overview Signed 6/25/2012  9:53 AM by Carolyn Hopkins MD     Likely secondary to [place] strain.              RESOLVED: Hypokalemia ICD-10-CM: E87.6  ICD-9-CM: 276.8  6/25/2012 - 7/3/2012        RESOLVED: Unspecified hypothyroidism ICD-10-CM: E03.9  ICD-9-CM: 244.9  6/21/2012 - 7/3/2012        Hypoxia ICD-10-CM: R09.02  ICD-9-CM: 799.02  7/3/2017 - Present        * (Principal)Aspiration pneumonia ([facility]- RLL ICD-10-CM: J69.0  ICD-9-CM: 507.0  7/3/2017 - Present        Dementia ICD-10-CM: F03.90  ICD-9-CM: 294.20  Unknown - Present        Debility, unspecified ICD-10-CM: R53.81  ICD-9-CM: 799.3  7/3/2012 - Present        Unspecified constipation (Chronic) ICD-10-CM: K59.00  ICD-9-CM: 564.00  7/3/2012 - Present        Failure to thrive in adult ICD-10-CM: R62.7  ICD-9-CM: 783.7  7/1/2012 - Present        Hx pulmonary embolism ICD-10-CM: B30.538  ICD-9-CM: V12.55  7/1/2012 - Present        HTN (hypertension) ICD-10-CM: I10  ICD-9-CM: 401.9  7/1/2012 - Present        RESOLVED: Abdominal pain, other specified site ICD-10-CM: R10.9  ICD-9-CM: 789.09  7/1/2012 - 7/3/2012        RESOLVED: Unspecified constipation ICD-10-CM: K59.00  ICD-9-CM: 564.00  7/1/2012 - 7/3/2012        RESOLVED: Acute respiratory failure ([facility] ICD-10-CM: J96.00  ICD-9-CM: 518.81  6/21/2012 - 6/23/2012        RESOLVED: Dehydration ICD-10-CM: E86.0  ICD-9-CM: 276.51  6/21/2012 - 6/23/2012              Discharge Medications:     Current Discharge Medication List      START taking these medications    Details   clindamycin (CLEOCIN) 300 mg capsule Take 1 Cap by mouth three (3) times daily for 5 days. Qty: 15 Cap, Refills: 0      levoFLOXacin (LEVAQUIN) 750 mg tablet Take 1 Tab by mouth every fourty-eight (48) hours for 5 days. Qty: 2 Tab, Refills: 0         CONTINUE these medications which have CHANGED    Details   amLODIPine (NORVASC) 10 mg tablet Take 1 Tab by mouth daily. Indications: hypertension  Qty: 30 Tab, Refills: 0      levothyroxine (SYNTHROID) 100 mcg tablet Take 1 Tab by mouth Daily (before breakfast). Qty: 30 Tab, Refills: 0         CONTINUE these medications which have NOT CHANGED    Details   losartan (COZAAR) 25 mg tablet Take  by mouth daily. bacitracin ophthalmic ointment Administer  to right eye three (3) times daily as needed. acetaminophen (TYLENOL) 325 mg tablet Take 650 mg by mouth every six (6) hours as needed for Pain.      polyethylene glycol (MIRALAX) 17 gram packet Take 17 g by mouth daily. carboxymethylcellulose sodium (REFRESH LIQUIGEL) 1 % dlgl Apply  to eye.      magnesium oxide (MAG-OX) 400 mg tablet Take 400 mg by mouth two (2) times a day. furosemide (LASIX) 20 mg tablet Take 20 mg by mouth daily. spironolactone (ALDACTONE) 25 mg tablet Take 25 mg by mouth daily. omeprazole (PRILOSEC) 20 mg capsule Take 20 mg by mouth daily. aspirin 81 mg chewable tablet Take 81 mg by mouth daily. ferrous sulfate 325 mg (65 mg iron) tablet Take  by mouth Daily (before breakfast).            STOP taking these medications       hydrocortisone (HYTONE) 2.5 % topical cream Comments:   Reason for Stopping:         folic acid-vit S3-ZGW B43 (FOLTX) 2.5-25-2 mg tablet Comments:   Reason for Stopping:         Menthol-Zinc Oxide (RISAMINE) 0.44-20.6 % oint Comments:   Reason for Stopping:         miconazole (MICOTIN) 2 % topical cream Comments:   Reason for Stopping:         cranberry extract 450 mg tab tablet Comments:   Reason for Stopping:         PSEUDOEPHEDRINE-dextromethorphan-guaiFENesin (ROBAFEN CF) 30- mg/5 mL solution Comments:   Reason for Stopping:         oxybutynin chloride XL (DITROPAN XL) 5 mg CR tablet Comments:   Reason for Stopping:         polyethylene glycol (MIRALAX) 17 gram/dose powder Comments:   Reason for Stopping:         cephALEXin (KEFLEX) 250 mg capsule Comments:   Reason for Stopping:         loperamide (IMODIUM) 2 mg capsule Comments:   Reason for Stopping:         ondansetron hcl (ZOFRAN, AS HYDROCHLORIDE,) 8 mg tablet Comments:   Reason for Stopping:         vitamin c-vitamin e (CRANBERRY CONCENTRATE) cap Comments:   Reason for Stopping:         oxybutynin (DITROPAN) 5 mg tablet Comments:   Reason for Stopping:         cholecalciferol, vitamin D3, (VITAMIN D3) 2,000 unit tab Comments:   Reason for Stopping:         CYANOCOBALAMIN/FA/PYRIDOXINE (FOLTX PO) Comments:   Reason for Stopping:         fludrocortisone (FLORINEF) 0.1 mg tablet Comments:   Reason for Stopping:         LORazepam (ATIVAN) 1 mg tablet Comments:   Reason for Stopping:               Discharge Condition: Good          PHYSICAL EXAM   Visit Vitals    /52 (BP 1 Location: Left arm, BP Patient Position: At rest)    Pulse 60    Temp 98.2 °F (36.8 °C)    Resp 16    Ht 5' 4\" (1.626 m)    Wt 54.9 kg (121 lb)    SpO2 99%    BMI 20.77 kg/m2     General: Awake, cooperative, no acute distress    HEENT: NC, Atraumatic. Right corneal haze. Anicteric sclerae. Lungs:  CTA Bilaterally.  No Wheezing/Rhonchi/Rales. Heart:  Regular  rhythm,  No murmur, No Rubs, No Gallops  Abdomen: Soft, Non distended, Non tender. +Bowel sounds,   Extremities: No c/c/e  Psych:   Not anxious or agitated. Neurologic:  No acute neurological deficits. Admission HPI : Genia Ruelas is a 80 y.o. female who has past history of CVA with right sided deficits, dementia, HTN is brought to ER [facility]. Patient has dementia and not able to provide reliable history, no family at bedside. Per reports patient was noticed by her home health nurse to be hypoxic, she also had crackles in her lung bases. EMS was called and patient brought to ER. In ER CXR showed Right mid to lower lung confluent opacity, representing pneumonia/atelectasis. She was given levaquin and admission requested for further management.        Hospital Course :   Pneumonia - patient admitted to medical floor, she was started on levaquin and clindamycin. Her pneumonia is likely secondary to aspiration. Will discharge her on these antibiotics to complete 7 day course. Dysphagia - seen by SLP recommend mechanical soft diet. MBS with no evidence of aspiration. HTN - uncontrolled, stopped florinef, increased norvasc to 10 mg daily. Continue other home medications. Failure to Thrive/advance dementia - patient was seen and followed by palliative. Dr. Deshaun De La Garza discussed with patient's daughter. Discussed long term goals. Daughter would like to get her mother discharged to SNF and if her condition is not skillable then will transition to hospice.              Activity: Activity as tolerated    Diet: mechanical soft    Follow-up: at SNF    Disposition: SNF    Minutes spent on discharge: 45       Labs: Results:       Chemistry Recent Labs      07/06/17   0458  07/05/17   0559   GLU  87  86   NA  143  145   K  4.3  3.2*   CL  108  108   CO2  31  31   BUN  20*  25*   CREA  1.23  1.37*   CA  8.2*  7.8*   AGAP  4  6 BUCR  16  18      CBC w/Diff Recent Labs      07/06/17   0458  07/05/17   0559   WBC  6.6  5.7   RBC  2.97*  2.94*   HGB  9.0*  8.8*   HCT  28.6*  28.8*   PLT  254  255      Cardiac Enzymes No results for input(s): CPK, CKND1, CARLOS in the last 72 hours. No lab exists for component: CKRMB, TROIP   Coagulation No results for input(s): PTP, INR, APTT in the last 72 hours. No lab exists for component: INREXT, INREXT    Lipid Panel No results found for: CHOL, CHOLPOCT, CHOLX, CHLST, CHOLV, 153194, HDL, LDL, LDLC, DLDLP, 150765, VLDLC, VLDL, TGLX, TRIGL, TRIGP, TGLPOCT, CHHD, CHHDX   BNP No results for input(s): BNPP in the last 72 hours. Liver Enzymes No results for input(s): TP, ALB, TBIL, AP, SGOT, GPT in the last 72 hours. No lab exists for component: DBIL   Thyroid Studies Lab Results   Component Value Date/Time    TSH 1.54 06/22/2012 12:50 AM            Significant Diagnostic Studies: Xr Swallow Func Video    Result Date: 7/6/2017  Procedure: Modified Barium Swallow with Speech Therapy Indication: Feeding difficulties, dysphagia, history of stroke and dementia Radiation dose (reference air kerma): 1.2 mGy Technique: Multiple consistencies administered by speech therapy, including thin, honey and pudding consistency. _______________ FINDINGS: -Thin: No findings of aspiration or laryngeal penetration. Within normal limits. -Honey barium:  No findings of aspiration or laryngeal penetration. Within normal limits. -Pudding:  No findings of aspiration or laryngeal penetration. Within normal limits. _______________     IMPRESSION: As above    Xr Chest Port    Result Date: 7/3/2017  EXAM:Chest X-Ray  History: Hypoxia Technique:  Portable Frontal View Comparison: 02/06/2017, 04/22/2015 _______________ FINDINGS: Mild rightward rotation degraded exam. The trachea is midline. Midline enlarged cardiac silhouette, not significantly changed.  Confluent right sided opacity extending from the hilum peripherally to the pleural surface, and inferiorly to the obscured right diaphragmatic surface. No discrete left lung opacity. No pneumothorax. Intact osseous structures. _______________     IMPRESSION: 1. Cardiomegaly. Right mid to lower lung confluent opacity, representing pneumonia/atelectasis. No results found for this or any previous visit.         CC: Mikael Tian MD

## 2017-07-07 NOTE — PROGRESS NOTES
D/C Plan:  [facility]"" 7/8/17    Reviewed updated therapy note and submitted to the desired facilities for review. Pt has been accepted to [facility] for admission Saturday (7/8/17). Medical transport will have to be arranged to [facility] ([address]). Please call report to [phone] and arrange transport for 11:30am.  Spoke with pt's daughter and she is aware and in agreement with this plan. No other plan of care needs have been identified.

## 2017-07-07 NOTE — PROGRESS NOTES
Palliative Medicine Progress Note  [facility]: [phone] ([phone])  [facility]: [phone] [address]: [phone]    Patient Name: Marino Escobedo  YOB: 1924    Date of Initial Consult: 7/5/17  Reason for Consult: care decisions  Requesting Provider: Dr. Ara Lawler   Primary Care Physician: Farhan Goncalves MD      SUMMARY:   Marino Escobedo is a 80y.o. year old with a past history of advanced dementia, recurrent UTIs,  CVA, HTN, who was admitted on 7/3/2017 from [address] [place] [address] w RLL pneumonia of aspiration origin. Very confused initially with acute on chronic renal failure. Has been at [facility] about three years, lived alone before that after 's death in 2002. No longer walking or consistently feeding herself. Dependent in toileting and bathing. She has two daughter, both local. Dwayne Desouza is HCPOA. Patient is feeling much better w/ hydration, denies complaints, would like \"some popcorn\". SLP swallowing assessment pending. Oriented to person only. Has an AMD that daughter will bring in.    7/6/17: Awake and alert. Confused overnight. 7/7/17: Awake and confused     PALLIATIVE DIAGNOSES:     Patient Active Problem List   Diagnosis Code    Failure to thrive in adult R62.7    Hx pulmonary embolism Z86.711    HTN (hypertension) I10    Debility, unspecified R53.81    Unspecified constipation K59.00    Hypoxia R09.02    Aspiration pneumonia ([facility]- RLL J69.0    Dementia F03.90     1. PLAN:   1. Met with daughter Best Phan and described clinical course and expected complication of disease and stage of her dementia. She relates her mother's clinical decline in last several months. She had always been very independent and the daughter is saddened by the change she has seen. She believes her mother would not want aggressive therapies to prolong her life if she is cannot get better.  She would not wish intubation, tube feeding or repeated hospitalizations. She has led a \"good life\" and now would wish to focus on comfort. They had already been pursuing an ICF medicaid placement and would like to know about a comfort care plan administered by Hospice if she is placed in LTC. Reviewed recommendations regarding artificial hydration/nutrition in advanced dementia. After discussing dementia and role of CPR, she is convinced her mother would not want this. DDNR executed. She would like to complete course of antibiotic and discharge to ICF w/ hospice. Questions answered. Hospice consult placed. 2. Consider stopping oxybutinin- [facility] med and patient is already incontinent, unlikely to be providing any clinical benefit. 7/6/17: Improving. DIet started. Hospice referral placed. Family wish to pursue ICF with hospice rather than SNF admission. 7/7/17: no family at bedside. Had expressed clear preference for ICF placement w/ hospice rather than SNF on 7/5. No info on why they've changed mind. 3. Initial consult note routed to primary continuity provider  4.  Communicated plan of care with: Palliative IDT       GOALS OF CARE:   Limited curative with hospice d/c    Advance Care Planning 7/5/2017   Patient's Healthcare Decision Maker is: Legal Next of Malik 69   Primary Decision Maker Name Henrique Sawant   Primary Decision Maker Phone Number [phone]   Primary Decision Maker Relationship to Patient Adult child   Confirm Advance Directive Yes, not on file   Does the patient have other document types Do Not Resuscitate           HISTORY:     History obtained from: chart, family    CHIEF COMPLAINT: see summary    HPI/SUBJECTIVE:    The patient is:   [] Verbal and participatory  [x] Non-participatory due to:   dementia     Clinical Pain Assessment (nonverbal scale for nonverbal patients): Pain: 0         FUNCTIONAL ASSESSMENT:     Palliative Performance Scale (PPS):  PPS: 30       PSYCHOSOCIAL/SPIRITUAL SCREENING:     Advance Care Planning:  Advance Care Planning 7/5/2017   Patient's Healthcare Decision Maker is: Legal Next of Malik La   Primary Decision Maker Name Ramonita Lubin   Primary Decision Maker Phone Number [phone]   Primary Decision Maker Relationship to Patient Adult child   Confirm Advance Directive Yes, not on file   Does the patient have other document types Do Not Resuscitate        Any spiritual / Christianity concerns:  [] Yes /  [x] No    Caregiver Burnout:  [] Yes /  [x] No /  [] No Caregiver Present      Anticipatory grief assessment:   [x] Normal  / [] Maladaptive       ESAS Anxiety: Anxiety: 0    ESAS Depression:          REVIEW OF SYSTEMS:     Positive and pertinent negative findings in ROS are noted above in HPI. The following systems were [x] reviewed / [] unable to be reviewed as noted in HPI  Other findings are noted below. Systems: constitutional, ears/nose/mouth/throat, respiratory, gastrointestinal, genitourinary, musculoskeletal, integumentary, neurologic, psychiatric, endocrine. Positive findings noted below. Modified ESAS Completed by: provider           Pain: 0   Anxiety: 0 Nausea: 0   Anorexia: 0 Dyspnea: 0     Constipation: Yes     Stool Occurrence(s): 0        PHYSICAL EXAM:     Wt Readings from Last 3 Encounters:   07/07/17 55.3 kg (121 lb 14.4 oz)   02/06/17 57.2 kg (126 lb)   04/22/15 61.7 kg (136 lb)     Blood pressure 165/52, pulse 60, temperature 98.2 °F (36.8 °C), resp. rate 16, height 5' 4\" (1.626 m), weight 55.3 kg (121 lb 14.4 oz), SpO2 99 %.   Pain:  Pain Scale 1: Numeric (0 - 10)  Pain Intensity 1: 0                 Last bowel movement:     Constitutional: elderly, NAD  Eyes: pupils equal, anicteric  ENMT: no nasal discharge, moist mucous membranes  Cardiovascular: regular rhythm, distal pulses intact  Respiratory: breathing not labored, symmetric  Gastrointestinal: soft non-tender, +bowel sounds  Musculoskeletal: no deformity, no tenderness to palpation  Skin: warm, dry  Neurologic: following simple commands, moving all extremities  Psychiatric: pleasantly confused  Other:       HISTORY:     Principal Problem:    Aspiration pneumonia ([address]- RLL (7/3/2017)    Active Problems:    Failure to thrive in adult (7/1/2012)      HTN (hypertension) (7/1/2012)      Debility, unspecified (7/3/2012)      Hypoxia (7/3/2017)      Dementia ()      Past Medical History:   Diagnosis Date    Anemia NEC     Dementia     Hearing reduced     Hypertension     Shingles     Stroke [facility])       Past Surgical History:   Procedure Laterality Date    ABDOMEN SURGERY PROC UNLISTED      HX CHOLECYSTECTOMY      HX GYN      HX HYSTERECTOMY        History reviewed. No pertinent family history. History reviewed, no pertinent family history.   Social History   Substance Use Topics    Smoking status: Former Smoker    Smokeless tobacco: Never Used    Alcohol use No     Allergies   Allergen Reactions    Penicillins Hives      Current Facility-Administered Medications   Medication Dose Route Frequency    amLODIPine (NORVASC) tablet 10 mg  10 mg Oral DAILY    aspirin chewable tablet 81 mg  81 mg Oral DAILY    white petrolatum-mineral oil (LACRILUBE S.O.P.) ointment   Ophthalmic QPM    levothyroxine (SYNTHROID) tablet 100 mcg  100 mcg Oral ACB    magnesium oxide (MAG-OX) tablet 400 mg  400 mg Oral DAILY    omeprazole (PRILOSEC) capsule 20 mg  20 mg Oral DAILY    spironolactone (ALDACTONE) tablet 25 mg  25 mg Oral DAILY    sodium chloride (NS) flush 5-10 mL  5-10 mL IntraVENous Q8H    sodium chloride (NS) flush 5-10 mL  5-10 mL IntraVENous PRN    levoFLOXacin (LEVAQUIN) 750 mg in D5W IVPB  750 mg IntraVENous Q48H    heparin (porcine) injection 5,000 Units  5,000 Units SubCUTAneous Q8H    Saccharomyces boulardii (FLORASTOR) capsule 250 mg  250 mg Oral BID  clindamycin phosphate (CLEOCIN) 300 mg in 0.9% sodium chloride (MBP/ADV) 100 mL ADV  300 mg IntraVENous Q6H        LAB AND IMAGING FINDINGS:     Lab Results   Component Value Date/Time    WBC 6.6 07/06/2017 04:58 AM    HGB 9.0 07/06/2017 04:58 AM    PLATELET 856 16/91/4873 04:58 AM     Lab Results   Component Value Date/Time    Sodium 143 07/06/2017 04:58 AM    Potassium 4.3 07/06/2017 04:58 AM    Chloride 108 07/06/2017 04:58 AM    CO2 31 07/06/2017 04:58 AM    BUN 20 07/06/2017 04:58 AM    Creatinine 1.23 07/06/2017 04:58 AM    Calcium 8.2 07/06/2017 04:58 AM    Magnesium 2.2 04/10/2010 04:30 AM      Lab Results   Component Value Date/Time    AST (SGOT) 29 07/03/2017 02:30 PM    Alk.  phosphatase 205 07/03/2017 02:30 PM    Protein, total 6.6 07/03/2017 02:30 PM    Albumin 2.4 07/03/2017 02:30 PM    Globulin 4.2 07/03/2017 02:30 PM     Lab Results   Component Value Date/Time    INR 1.0 08/12/2016 04:25 PM    Prothrombin time 12.5 08/12/2016 04:25 PM    aPTT 28.9 08/12/2016 04:25 PM      Lab Results   Component Value Date/Time    Iron 34 07/05/2017 10:49 AM    TIBC 114 07/05/2017 10:49 AM    Iron % saturation 30 07/05/2017 10:49 AM      No results found for: PH, PCO2, PO2  No components found for: [place]   Lab Results   Component Value Date/Time    CK 52 07/03/2017 02:30 PM    CK - MB 2.4 07/03/2017 02:30 PM              Total time:15 min  Counseling / coordination time, spent as noted above: 12  > 50% counseling / coordination        Liz Crespo MD  Palliative Medicine

## 2017-07-07 NOTE — ROUTINE PROCESS
Bedside and verbal shift change report given to Natasha Durham RN (ongoing nurse) by Cande Javier RN (offgoing nurse) report include SBAR, Kardex, Recent results and Med Rec Status.

## 2017-07-07 NOTE — PROGRESS NOTES
Noted pt has an order for discharge. No accepting facility at this time. Awaiting current therapy notes to assist with placement. Spoke with pt's daughter, Ms. Jacquelyn Tamayo, to discuss pt's plan of care. She would like rehab at this time and does not wish to have hospice. Discussed other SNF options and she would like to have clinical sent to the [facility] and [address] [place] [address] for review. Clinical [facility] and at this time they are unable to accept and will review Monday once further therapy evaluation is completed. Given there is no accepting facility, CM to follow up on Monday. CM continuing to follow.

## 2017-07-07 NOTE — PROGRESS NOTES
Shift Summary: Patient had an uneventful day. Vital signs within normal limits. No complaints voiced. Verbal shift change report given to Isabelle Alvarez RN (oncoming nurse) by Vazquez Swenson RN (offgoing nurse). Report included the following information SBAR, Kardex and MAR.

## 2017-07-07 NOTE — PROGRESS NOTES
Shift assessment completed. Pt in bed resting quietly, bed alarm on. A/Ox2. Lung sounds clear, 1L nasal cannula. Bowel sound active. Pt denies any numbness or tingling to extremities. Pt laying to left side with pillows under right. Incontinent brief dry.

## 2017-07-07 NOTE — PROGRESS NOTES
Shift Summary:  Patient slept through the night, only waking up for diaper changes and repositioning. Denied pain or discomfort overnight.

## 2017-07-07 NOTE — PROGRESS NOTES
Problem: Mobility Impaired (Adult and Pediatric)  Goal: *Acute Goals and Plan of Care (Insert Text)  Physical Therapy Goals  Initiated 7/6/2017 and to be accomplished within 3 day(s)  1. Patient will move from supine <> sit with Min A in prep for out of bed activity and change of position. 2. Patient will perform sit<> stand with Max A with rolling walker in prep for transfers/ambulation. 3. Patient will perform lateral transfer from bed <> chair with Max A with transfer board for time up in chair for completion of ADL activity. 4. Patient will be able to perform therex activity w/ Min A and Min VCing. Outcome: Progressing Towards Goal  PHYSICAL THERAPY TREATMENT     Patient: Lanice Severance (24 y.o. female)  Date: 7/7/2017  Diagnosis: Hypoxia Aspiration pneumonia ([facility] [address]       Precautions:Fall     Chart, physical therapy assessment, plan of care and goals were reviewed. ASSESSMENT:  Pt was seen in bed with all bed rails up, bed alarm on, and supplemental O2. Pt continues to be oriented to self. During bed mobility task supine > sit, initially pt is able to perform command but then needs assistance to complete task secondary to pt stopping while performing task. Once pt is seated pt is able to maintain sitting balance, but demonstrates kyphotic posture while in sitting and is unable to have an erect trunk. Pt infrequently is able to follow commands and requires max VCing and TCing to complete task. Pt was able to perform therex activity and dynamic sitting balance activities but at times needed assistance following task. Pt reported throughout session decrease activity tolerance and was transferred back to bed. Call bell in reach, bed rails up, bed alarm on, nurse notified after session.    Progression toward goals:  [ ]      Improving appropriately and progressing toward goals  [X]      Improving slowly and progressing toward goals  [ ]      Not making progress toward goals and plan of care will be adjusted       PLAN:  Patient continues to benefit from skilled intervention to address the above impairments. Continue treatment per established plan of care. Discharge Recommendations:  [place] [facility] [place]  Further Equipment Recommendations for Discharge:  TBD by Rehab Facility       SUBJECTIVE:   Patient stated I'm so cold.       OBJECTIVE DATA SUMMARY:   Critical Behavior:  Neurologic State: Alert, Appropriate for age  Orientation Level: Oriented X4  Cognition: Decreased attention/concentration  Safety/Judgement: Decreased awareness of environment, Decreased insight into deficits  Functional Mobility Training:  Bed Mobility:  Rolling: Contact guard assistance;Minimum assistance  Supine to Sit: Moderate assistance; Additional time; Other (comment) (HOB elevated for assistance)  Sit to Supine: Moderate assistance; Additional time; Other (comment) (HOB elevated for assistance)  Scooting: Total assistance; Additional time;Assist x2  Balance:  Sitting: Intact; With support  Standing:  (Not Tested)  Therapeutic Exercises:   Dynamic sitting Balance activities: Reaching  LAQ x 10  Ankle pumps x 5  (Active Assisted)  Pain:  Pain Scale 1: Numeric (0 - 10)  Pain Intensity 1: 0  Activity Tolerance:   Fair-  Please refer to the flowsheet for vital signs taken during this treatment.   After treatment:   [ ] Patient left in no apparent distress sitting up in chair  [X] Patient left in no apparent distress in bed  [X] Call bell left within reach  [X] Nursing notified  [ ] Caregiver present  [X] Bed alarm activated      Savana Collazo PT   Time Calculation: 18 mins

## 2017-07-08 VITALS
WEIGHT: 122.36 LBS | DIASTOLIC BLOOD PRESSURE: 88 MMHG | BODY MASS INDEX: 20.89 KG/M2 | TEMPERATURE: 98 F | OXYGEN SATURATION: 95 % | HEART RATE: 76 BPM | RESPIRATION RATE: 19 BRPM | SYSTOLIC BLOOD PRESSURE: 146 MMHG | HEIGHT: 64 IN

## 2017-07-08 PROCEDURE — 74011250636 HC RX REV CODE- 250/636: Performed by: HOSPITALIST

## 2017-07-08 PROCEDURE — 74011250637 HC RX REV CODE- 250/637: Performed by: HOSPITALIST

## 2017-07-08 PROCEDURE — 77010033678 HC OXYGEN DAILY

## 2017-07-08 RX ADMIN — AMLODIPINE BESYLATE 10 MG: 5 TABLET ORAL at 10:36

## 2017-07-08 RX ADMIN — ASPIRIN 81 MG 81 MG: 81 TABLET ORAL at 10:36

## 2017-07-08 RX ADMIN — LEVOTHYROXINE SODIUM 100 MCG: 100 TABLET ORAL at 06:11

## 2017-07-08 RX ADMIN — Medication 250 MG: at 10:36

## 2017-07-08 RX ADMIN — OMEPRAZOLE 20 MG: 20 CAPSULE, DELAYED RELEASE ORAL at 10:36

## 2017-07-08 RX ADMIN — SPIRONOLACTONE 25 MG: 25 TABLET, FILM COATED ORAL at 10:36

## 2017-07-08 RX ADMIN — Medication 400 MG: at 10:37

## 2017-07-08 RX ADMIN — HEPARIN SODIUM 5000 UNITS: 5000 INJECTION, SOLUTION INTRAVENOUS; SUBCUTANEOUS at 10:37

## 2017-07-08 NOTE — DISCHARGE SUMMARY
[address] [place] [address]  TRANSFER SUMMARY    Name:  Jelena Dunbar  MR#:  [id number]  :  [id number]  Account #:  [de-identified]  Date of Adm:  2017  Date of Transfer:      ADDENDUM    Her discharge summary was dictated yesterday in anticipation of her  transfer today to a skilled nursing facility. DIAGNOSES  Again, in summary:  1. Pneumonia. 2. Advanced dementia. 3. History of stroke. 4. Iron-deficiency anemia. 5. Weakness and failure to thrive. 6. Hypertension. 7. Hypoxia. 8. Hypokalemia. MEDICATIONS:  Are as dictated already on the discharge summary  with no acute changes. Overnight, the patient has been clinically stable. There have been no  changes reported by nursing and her vital signs have remained stable. PHYSICAL EXAMINATION  VITAL SIGNS:  Today's blood pressure 155/62, pulse 66, temperature  97.4, respiratory rate 18, SpO2 is 96% on 2 liters. GENERAL:  She appears as a chronically debilitated, elderly and  confused white female who is resting in the hospital bed in no acute  distress. HEENT:  There is some mild injection to the right conjunctiva. NECK:  Supple, no thyromegaly. LUNGS:  Clear anteriorly. No rales, rhonchi or wheezes. CARDIAC:  Regular rate and rhythm. ABDOMEN:  Soft, nontender, flat without distention. LOWER EXTREMITIES:  Trace ankle edema. LABORATORY DATA:  No new labs today. She did have a modified  barium as noted here. There were no notable findings of aspiration. DIET:  Her current diet instructions are dysphagia, mechanically altered  with nectar-thick liquids. MEDICATIONS:  Her medications including oral antibiotics are as  dictated on the original discharge summary, to continue. CONDITION/DISPOSITION:  Today, she is clinically stable for release  to the nursing facility with arrangements made to go to [facility]  today. DISCHARGE TIME:  45 minutes total was spent on discharge time for  this patient.     CODE STATUS: She has a DO NOT RESUSCITATE code status.         MD GURINDER Bender / [email or web address]  D:  07/08/2017   08:29  T:  07/08/2017   08:40  Job #:  [id number]

## 2017-07-08 NOTE — PROGRESS NOTES
No New changes in this shift To be D/C in AM.     Patient Vitals for the past 12 hrs:   Temp Pulse Resp BP SpO2   07/08/17 0529 (P) 97.8 °F (36.6 °C) (P) 72 (P) 18 (!) (P) 140/98 (P) 90 %   07/07/17 2231 97.4 °F (36.3 °C) 66 18 155/62 -   07/07/17 1938 98.2 °F (36.8 °C) (!) 59 18 148/65 96 %

## 2017-07-08 NOTE — ROUTINE PROCESS
Bedside shift change report given to Shawna Hoffman RN (oncoming nurse) by Edgar Romero RN (offgoing nurse).  Report included the following information SBAR, MAR, Recent Results and Alarm Parameters      Alarm parameters reviewed, on and audible Appropriate for patient clinical condition

## 2017-07-09 LAB
BACTERIA SPEC CULT: NORMAL
SERVICE CMNT-IMP: NORMAL